# Patient Record
Sex: FEMALE | Race: WHITE | URBAN - METROPOLITAN AREA
[De-identification: names, ages, dates, MRNs, and addresses within clinical notes are randomized per-mention and may not be internally consistent; named-entity substitution may affect disease eponyms.]

---

## 2017-01-01 ENCOUNTER — CARE COORDINATION (OUTPATIENT)
Dept: GASTROENTEROLOGY | Facility: CLINIC | Age: 77
End: 2017-01-01

## 2017-01-01 ENCOUNTER — DOCUMENTATION ONLY (OUTPATIENT)
Dept: GASTROENTEROLOGY | Facility: CLINIC | Age: 77
End: 2017-01-01

## 2017-01-01 ENCOUNTER — TRANSFERRED RECORDS (OUTPATIENT)
Dept: HEALTH INFORMATION MANAGEMENT | Facility: CLINIC | Age: 77
End: 2017-01-01

## 2017-01-01 ENCOUNTER — ANESTHESIA (OUTPATIENT)
Dept: SURGERY | Facility: CLINIC | Age: 77
DRG: 435 | End: 2017-01-01
Payer: MEDICARE

## 2017-01-01 ENCOUNTER — MEDICAL CORRESPONDENCE (OUTPATIENT)
Dept: HEALTH INFORMATION MANAGEMENT | Facility: CLINIC | Age: 77
End: 2017-01-01

## 2017-01-01 ENCOUNTER — ANESTHESIA EVENT (OUTPATIENT)
Dept: SURGERY | Facility: CLINIC | Age: 77
DRG: 435 | End: 2017-01-01
Payer: MEDICARE

## 2017-01-01 ENCOUNTER — CARE COORDINATION (OUTPATIENT)
Dept: CARE COORDINATION | Facility: CLINIC | Age: 77
End: 2017-01-01

## 2017-01-01 ENCOUNTER — HOSPITAL ENCOUNTER (INPATIENT)
Facility: CLINIC | Age: 77
LOS: 4 days | Discharge: HOME | DRG: 435 | End: 2017-12-05
Attending: INTERNAL MEDICINE | Admitting: FAMILY MEDICINE
Payer: MEDICARE

## 2017-01-01 ENCOUNTER — HOSPITAL ENCOUNTER (EMERGENCY)
Facility: CLINIC | Age: 77
Discharge: HOME | End: 2017-10-30
Attending: EMERGENCY MEDICINE | Admitting: EMERGENCY MEDICINE
Payer: MEDICARE

## 2017-01-01 ENCOUNTER — APPOINTMENT (OUTPATIENT)
Dept: GENERAL RADIOLOGY | Facility: CLINIC | Age: 77
DRG: 435 | End: 2017-01-01
Attending: INTERNAL MEDICINE
Payer: MEDICARE

## 2017-01-01 ENCOUNTER — APPOINTMENT (OUTPATIENT)
Dept: GENERAL RADIOLOGY | Facility: CLINIC | Age: 77
End: 2017-01-01
Attending: EMERGENCY MEDICINE
Payer: MEDICARE

## 2017-01-01 VITALS
HEART RATE: 81 BPM | TEMPERATURE: 98.1 F | OXYGEN SATURATION: 94 % | WEIGHT: 167.9 LBS | DIASTOLIC BLOOD PRESSURE: 46 MMHG | HEIGHT: 60 IN | RESPIRATION RATE: 16 BRPM | BODY MASS INDEX: 32.96 KG/M2 | SYSTOLIC BLOOD PRESSURE: 148 MMHG

## 2017-01-01 VITALS
WEIGHT: 165 LBS | RESPIRATION RATE: 18 BRPM | TEMPERATURE: 97.9 F | SYSTOLIC BLOOD PRESSURE: 132 MMHG | DIASTOLIC BLOOD PRESSURE: 90 MMHG | BODY MASS INDEX: 29.24 KG/M2 | OXYGEN SATURATION: 96 % | HEART RATE: 89 BPM

## 2017-01-01 DIAGNOSIS — I67.83 PRES (POSTERIOR REVERSIBLE ENCEPHALOPATHY SYNDROME): ICD-10-CM

## 2017-01-01 DIAGNOSIS — K86.89 PANCREATIC MASS: Primary | ICD-10-CM

## 2017-01-01 DIAGNOSIS — R10.84 ABDOMINAL PAIN, GENERALIZED: ICD-10-CM

## 2017-01-01 LAB
ALBUMIN SERPL-MCNC: 1.5 G/DL (ref 3.4–5)
ALBUMIN SERPL-MCNC: 1.6 G/DL (ref 3.4–5)
ALBUMIN SERPL-MCNC: 1.7 G/DL (ref 3.4–5)
ALBUMIN SERPL-MCNC: 1.7 G/DL (ref 3.4–5)
ALBUMIN SERPL-MCNC: 1.8 G/DL (ref 3.4–5)
ALBUMIN SERPL-MCNC: 2.5 G/DL (ref 3.4–5)
ALBUMIN UR-MCNC: 10 MG/DL
ALBUMIN UR-MCNC: NEGATIVE MG/DL
ALP SERPL-CCNC: 1371 U/L (ref 40–150)
ALP SERPL-CCNC: 146 U/L (ref 40–150)
ALP SERPL-CCNC: 1632 U/L (ref 40–150)
ALP SERPL-CCNC: 1678 U/L (ref 40–150)
ALP SERPL-CCNC: >2330 U/L (ref 40–150)
ALP SERPL-CCNC: >2330 U/L (ref 40–150)
ALT SERPL W P-5'-P-CCNC: 147 U/L (ref 0–50)
ALT SERPL W P-5'-P-CCNC: 178 U/L (ref 0–50)
ALT SERPL W P-5'-P-CCNC: 20 U/L (ref 0–50)
ALT SERPL W P-5'-P-CCNC: 73 U/L (ref 0–50)
ALT SERPL W P-5'-P-CCNC: 86 U/L (ref 0–50)
ALT SERPL W P-5'-P-CCNC: 99 U/L (ref 0–50)
ANION GAP SERPL CALCULATED.3IONS-SCNC: 5 MMOL/L (ref 3–14)
ANION GAP SERPL CALCULATED.3IONS-SCNC: 7 MMOL/L (ref 3–14)
ANION GAP SERPL CALCULATED.3IONS-SCNC: 8 MMOL/L (ref 3–14)
ANION GAP SERPL CALCULATED.3IONS-SCNC: 9 MMOL/L (ref 3–14)
ANNOTATION COMMENT IMP: ABNORMAL
APPEARANCE UR: ABNORMAL
APPEARANCE UR: CLEAR
AST SERPL W P-5'-P-CCNC: 143 U/L (ref 0–45)
AST SERPL W P-5'-P-CCNC: 25 U/L (ref 0–45)
AST SERPL W P-5'-P-CCNC: 310 U/L (ref 0–45)
AST SERPL W P-5'-P-CCNC: 48 U/L (ref 0–45)
AST SERPL W P-5'-P-CCNC: 58 U/L (ref 0–45)
AST SERPL W P-5'-P-CCNC: 69 U/L (ref 0–45)
BACTERIA SPEC CULT: ABNORMAL
BACTERIA SPEC CULT: ABNORMAL
BASOPHILS # BLD AUTO: 0 10E9/L (ref 0–0.2)
BASOPHILS NFR BLD AUTO: 0.4 %
BASOPHILS NFR BLD AUTO: 0.5 %
BASOPHILS NFR BLD AUTO: 0.5 %
BASOPHILS NFR BLD AUTO: 0.9 %
BILIRUB DIRECT SERPL-MCNC: 7.4 MG/DL (ref 0–0.2)
BILIRUB SERPL-MCNC: 0.4 MG/DL (ref 0.2–1.3)
BILIRUB SERPL-MCNC: 2.4 MG/DL (ref 0.2–1.3)
BILIRUB SERPL-MCNC: 2.7 MG/DL (ref 0.2–1.3)
BILIRUB SERPL-MCNC: 3.1 MG/DL (ref 0.2–1.3)
BILIRUB SERPL-MCNC: 3.6 MG/DL (ref 0.2–1.3)
BILIRUB SERPL-MCNC: 8.8 MG/DL (ref 0.2–1.3)
BILIRUB UR QL STRIP: ABNORMAL
BILIRUB UR QL STRIP: NEGATIVE
BUN SERPL-MCNC: 14 MG/DL (ref 7–30)
BUN SERPL-MCNC: 4 MG/DL (ref 7–30)
BUN SERPL-MCNC: 5 MG/DL (ref 7–30)
BUN SERPL-MCNC: 5 MG/DL (ref 7–30)
BUN SERPL-MCNC: 6 MG/DL (ref 7–30)
BUN SERPL-MCNC: 6 MG/DL (ref 7–30)
CALCIUM SERPL-MCNC: 7.8 MG/DL (ref 8.5–10.1)
CALCIUM SERPL-MCNC: 7.9 MG/DL (ref 8.5–10.1)
CALCIUM SERPL-MCNC: 8.2 MG/DL (ref 8.5–10.1)
CALCIUM SERPL-MCNC: 8.6 MG/DL (ref 8.5–10.1)
CAOX CRY #/AREA URNS HPF: ABNORMAL /HPF
CHLORIDE SERPL-SCNC: 107 MMOL/L (ref 94–109)
CHLORIDE SERPL-SCNC: 107 MMOL/L (ref 94–109)
CHLORIDE SERPL-SCNC: 109 MMOL/L (ref 94–109)
CHLORIDE SERPL-SCNC: 109 MMOL/L (ref 94–109)
CHLORIDE SERPL-SCNC: 110 MMOL/L (ref 94–109)
CHLORIDE SERPL-SCNC: 111 MMOL/L (ref 94–109)
CO2 SERPL-SCNC: 22 MMOL/L (ref 20–32)
CO2 SERPL-SCNC: 23 MMOL/L (ref 20–32)
CO2 SERPL-SCNC: 24 MMOL/L (ref 20–32)
CO2 SERPL-SCNC: 24 MMOL/L (ref 20–32)
CO2 SERPL-SCNC: 25 MMOL/L (ref 20–32)
CO2 SERPL-SCNC: 27 MMOL/L (ref 20–32)
COLOR UR AUTO: ABNORMAL
COLOR UR AUTO: YELLOW
COPATH REPORT: NORMAL
COPPER SERPL-MCNC: 81 UG/DL (ref 80–155)
CREAT SERPL-MCNC: 0.64 MG/DL (ref 0.52–1.04)
CREAT SERPL-MCNC: 0.69 MG/DL (ref 0.52–1.04)
CREAT SERPL-MCNC: 0.71 MG/DL (ref 0.52–1.04)
CREAT SERPL-MCNC: 0.72 MG/DL (ref 0.52–1.04)
CREAT SERPL-MCNC: 0.74 MG/DL (ref 0.52–1.04)
CREAT SERPL-MCNC: 0.81 MG/DL (ref 0.52–1.04)
DEPRECATED CALCIDIOL+CALCIFEROL SERPL-MC: 40 UG/L (ref 20–75)
DIFFERENTIAL METHOD BLD: ABNORMAL
EOSINOPHIL # BLD AUTO: 0.1 10E9/L (ref 0–0.7)
EOSINOPHIL # BLD AUTO: 0.2 10E9/L (ref 0–0.7)
EOSINOPHIL # BLD AUTO: 0.2 10E9/L (ref 0–0.7)
EOSINOPHIL # BLD AUTO: 0.3 10E9/L (ref 0–0.7)
EOSINOPHIL # BLD AUTO: 0.3 10E9/L (ref 0–0.7)
EOSINOPHIL # BLD AUTO: 0.4 10E9/L (ref 0–0.7)
EOSINOPHIL NFR BLD AUTO: 1.6 %
EOSINOPHIL NFR BLD AUTO: 4.3 %
EOSINOPHIL NFR BLD AUTO: 5.9 %
EOSINOPHIL NFR BLD AUTO: 6.8 %
EOSINOPHIL NFR BLD AUTO: 6.9 %
EOSINOPHIL NFR BLD AUTO: 7.6 %
ERCP: NORMAL
ERYTHROCYTE [DISTWIDTH] IN BLOOD BY AUTOMATED COUNT: 14.7 % (ref 10–15)
ERYTHROCYTE [DISTWIDTH] IN BLOOD BY AUTOMATED COUNT: 17 % (ref 10–15)
ERYTHROCYTE [DISTWIDTH] IN BLOOD BY AUTOMATED COUNT: 17.1 % (ref 10–15)
ERYTHROCYTE [DISTWIDTH] IN BLOOD BY AUTOMATED COUNT: 17.2 % (ref 10–15)
ERYTHROCYTE [DISTWIDTH] IN BLOOD BY AUTOMATED COUNT: 17.6 % (ref 10–15)
ERYTHROCYTE [DISTWIDTH] IN BLOOD BY AUTOMATED COUNT: 18.2 % (ref 10–15)
FERRITIN SERPL-MCNC: 276 NG/ML (ref 8–252)
FOLATE SERPL-MCNC: 19.9 NG/ML
GFR SERPL CREATININE-BSD FRML MDRD: 69 ML/MIN/1.7M2
GFR SERPL CREATININE-BSD FRML MDRD: 75 ML/MIN/1.7M2
GFR SERPL CREATININE-BSD FRML MDRD: 79 ML/MIN/1.7M2
GFR SERPL CREATININE-BSD FRML MDRD: 80 ML/MIN/1.7M2
GFR SERPL CREATININE-BSD FRML MDRD: 83 ML/MIN/1.7M2
GFR SERPL CREATININE-BSD FRML MDRD: >90 ML/MIN/1.7M2
GLUCOSE BLDC GLUCOMTR-MCNC: 90 MG/DL (ref 70–99)
GLUCOSE SERPL-MCNC: 109 MG/DL (ref 70–99)
GLUCOSE SERPL-MCNC: 110 MG/DL (ref 70–99)
GLUCOSE SERPL-MCNC: 120 MG/DL (ref 70–99)
GLUCOSE SERPL-MCNC: 128 MG/DL (ref 70–99)
GLUCOSE SERPL-MCNC: 93 MG/DL (ref 70–99)
GLUCOSE SERPL-MCNC: 99 MG/DL (ref 70–99)
GLUCOSE UR STRIP-MCNC: NEGATIVE MG/DL
GLUCOSE UR STRIP-MCNC: NEGATIVE MG/DL
HCT VFR BLD AUTO: 32.9 % (ref 35–47)
HCT VFR BLD AUTO: 33.8 % (ref 35–47)
HCT VFR BLD AUTO: 34 % (ref 35–47)
HCT VFR BLD AUTO: 35.4 % (ref 35–47)
HCT VFR BLD AUTO: 37.2 % (ref 35–47)
HCT VFR BLD AUTO: 39.9 % (ref 35–47)
HGB BLD-MCNC: 10.3 G/DL (ref 11.7–15.7)
HGB BLD-MCNC: 10.4 G/DL (ref 11.7–15.7)
HGB BLD-MCNC: 10.7 G/DL (ref 11.7–15.7)
HGB BLD-MCNC: 11.3 G/DL (ref 11.7–15.7)
HGB BLD-MCNC: 12.3 G/DL (ref 11.7–15.7)
HGB BLD-MCNC: 9.9 G/DL (ref 11.7–15.7)
HGB UR QL STRIP: NEGATIVE
HGB UR QL STRIP: NEGATIVE
HYALINE CASTS #/AREA URNS LPF: 14 /LPF (ref 0–2)
IMM GRANULOCYTES # BLD: 0 10E9/L (ref 0–0.4)
IMM GRANULOCYTES NFR BLD: 0 %
IMM GRANULOCYTES NFR BLD: 0.2 %
IMM GRANULOCYTES NFR BLD: 0.5 %
IMM GRANULOCYTES NFR BLD: 0.6 %
INR PPP: 1.09 (ref 0.86–1.14)
IRON SATN MFR SERPL: 51 % (ref 15–46)
IRON SERPL-MCNC: 63 UG/DL (ref 35–180)
KETONES UR STRIP-MCNC: NEGATIVE MG/DL
KETONES UR STRIP-MCNC: NEGATIVE MG/DL
LACTATE BLD-SCNC: 0.8 MMOL/L (ref 0.7–2)
LACTATE BLD-SCNC: 0.8 MMOL/L (ref 0.7–2)
LEUKOCYTE ESTERASE UR QL STRIP: NEGATIVE
LEUKOCYTE ESTERASE UR QL STRIP: NEGATIVE
LIPASE SERPL-CCNC: 31 U/L (ref 73–393)
LIPASE SERPL-CCNC: 55 U/L (ref 73–393)
LYMPHOCYTES # BLD AUTO: 0.9 10E9/L (ref 0.8–5.3)
LYMPHOCYTES # BLD AUTO: 1 10E9/L (ref 0.8–5.3)
LYMPHOCYTES # BLD AUTO: 1 10E9/L (ref 0.8–5.3)
LYMPHOCYTES # BLD AUTO: 1.1 10E9/L (ref 0.8–5.3)
LYMPHOCYTES # BLD AUTO: 1.3 10E9/L (ref 0.8–5.3)
LYMPHOCYTES # BLD AUTO: 1.6 10E9/L (ref 0.8–5.3)
LYMPHOCYTES NFR BLD AUTO: 19.8 %
LYMPHOCYTES NFR BLD AUTO: 21 %
LYMPHOCYTES NFR BLD AUTO: 23 %
LYMPHOCYTES NFR BLD AUTO: 23.1 %
LYMPHOCYTES NFR BLD AUTO: 28.4 %
LYMPHOCYTES NFR BLD AUTO: 32 %
Lab: ABNORMAL
MCH RBC QN AUTO: 30.2 PG (ref 26.5–33)
MCH RBC QN AUTO: 30.7 PG (ref 26.5–33)
MCH RBC QN AUTO: 30.7 PG (ref 26.5–33)
MCH RBC QN AUTO: 30.8 PG (ref 26.5–33)
MCH RBC QN AUTO: 31 PG (ref 26.5–33)
MCH RBC QN AUTO: 31 PG (ref 26.5–33)
MCHC RBC AUTO-ENTMCNC: 30.1 G/DL (ref 31.5–36.5)
MCHC RBC AUTO-ENTMCNC: 30.2 G/DL (ref 31.5–36.5)
MCHC RBC AUTO-ENTMCNC: 30.4 G/DL (ref 31.5–36.5)
MCHC RBC AUTO-ENTMCNC: 30.5 G/DL (ref 31.5–36.5)
MCHC RBC AUTO-ENTMCNC: 30.6 G/DL (ref 31.5–36.5)
MCHC RBC AUTO-ENTMCNC: 30.8 G/DL (ref 31.5–36.5)
MCV RBC AUTO: 100 FL (ref 78–100)
MCV RBC AUTO: 100 FL (ref 78–100)
MCV RBC AUTO: 101 FL (ref 78–100)
MCV RBC AUTO: 101 FL (ref 78–100)
MCV RBC AUTO: 102 FL (ref 78–100)
MCV RBC AUTO: 102 FL (ref 78–100)
METHYLMALONATE SERPL-SCNC: 0.18 UMOL/L (ref 0–0.4)
MONOCYTES # BLD AUTO: 0.4 10E9/L (ref 0–1.3)
MONOCYTES # BLD AUTO: 0.5 10E9/L (ref 0–1.3)
MONOCYTES NFR BLD AUTO: 10.3 %
MONOCYTES NFR BLD AUTO: 7.9 %
MONOCYTES NFR BLD AUTO: 8 %
MONOCYTES NFR BLD AUTO: 8.9 %
MONOCYTES NFR BLD AUTO: 9.2 %
MONOCYTES NFR BLD AUTO: 9.6 %
MUCOUS THREADS #/AREA URNS LPF: PRESENT /LPF
MUCOUS THREADS #/AREA URNS LPF: PRESENT /LPF
NEUTROPHILS # BLD AUTO: 2.4 10E9/L (ref 1.6–8.3)
NEUTROPHILS # BLD AUTO: 2.4 10E9/L (ref 1.6–8.3)
NEUTROPHILS # BLD AUTO: 2.6 10E9/L (ref 1.6–8.3)
NEUTROPHILS # BLD AUTO: 3 10E9/L (ref 1.6–8.3)
NEUTROPHILS # BLD AUTO: 3.1 10E9/L (ref 1.6–8.3)
NEUTROPHILS # BLD AUTO: 3.3 10E9/L (ref 1.6–8.3)
NEUTROPHILS NFR BLD AUTO: 49.5 %
NEUTROPHILS NFR BLD AUTO: 55.6 %
NEUTROPHILS NFR BLD AUTO: 60.9 %
NEUTROPHILS NFR BLD AUTO: 62 %
NEUTROPHILS NFR BLD AUTO: 66 %
NEUTROPHILS NFR BLD AUTO: 66.9 %
NITRATE UR QL: NEGATIVE
NITRATE UR QL: NEGATIVE
NRBC # BLD AUTO: 0 10*3/UL
NRBC BLD AUTO-RTO: 0 /100
PH UR STRIP: 5 PH (ref 5–7)
PH UR STRIP: 6 PH (ref 5–7)
PLATELET # BLD AUTO: 183 10E9/L (ref 150–450)
PLATELET # BLD AUTO: 246 10E9/L (ref 150–450)
PLATELET # BLD AUTO: 259 10E9/L (ref 150–450)
PLATELET # BLD AUTO: 262 10E9/L (ref 150–450)
PLATELET # BLD AUTO: 271 10E9/L (ref 150–450)
PLATELET # BLD AUTO: 278 10E9/L (ref 150–450)
POTASSIUM SERPL-SCNC: 3.3 MMOL/L (ref 3.4–5.3)
POTASSIUM SERPL-SCNC: 3.7 MMOL/L (ref 3.4–5.3)
POTASSIUM SERPL-SCNC: 3.7 MMOL/L (ref 3.4–5.3)
POTASSIUM SERPL-SCNC: 4 MMOL/L (ref 3.4–5.3)
POTASSIUM SERPL-SCNC: 4.1 MMOL/L (ref 3.4–5.3)
POTASSIUM SERPL-SCNC: 4.2 MMOL/L (ref 3.4–5.3)
POTASSIUM SERPL-SCNC: 4.2 MMOL/L (ref 3.4–5.3)
PROT SERPL-MCNC: 4.5 G/DL (ref 6.8–8.8)
PROT SERPL-MCNC: 4.6 G/DL (ref 6.8–8.8)
PROT SERPL-MCNC: 4.9 G/DL (ref 6.8–8.8)
PROT SERPL-MCNC: 4.9 G/DL (ref 6.8–8.8)
PROT SERPL-MCNC: 5.1 G/DL (ref 6.8–8.8)
PROT SERPL-MCNC: 5.5 G/DL (ref 6.8–8.8)
RBC # BLD AUTO: 3.23 10E12/L (ref 3.8–5.2)
RBC # BLD AUTO: 3.34 10E12/L (ref 3.8–5.2)
RBC # BLD AUTO: 3.36 10E12/L (ref 3.8–5.2)
RBC # BLD AUTO: 3.54 10E12/L (ref 3.8–5.2)
RBC # BLD AUTO: 3.64 10E12/L (ref 3.8–5.2)
RBC # BLD AUTO: 4.01 10E12/L (ref 3.8–5.2)
RBC #/AREA URNS AUTO: 0 /HPF (ref 0–2)
RBC #/AREA URNS AUTO: 3 /HPF (ref 0–2)
RETINYL PALMITATE SERPL-MCNC: 0.04 MG/L (ref 0–0.1)
SELENIUM SERPL-MCNC: 96 UG/L (ref 23–190)
SODIUM SERPL-SCNC: 139 MMOL/L (ref 133–144)
SODIUM SERPL-SCNC: 141 MMOL/L (ref 133–144)
SODIUM SERPL-SCNC: 142 MMOL/L (ref 133–144)
SODIUM SERPL-SCNC: 143 MMOL/L (ref 133–144)
SOURCE: ABNORMAL
SOURCE: ABNORMAL
SP GR UR STRIP: 1.01 (ref 1–1.03)
SP GR UR STRIP: 1.01 (ref 1–1.03)
SPECIMEN SOURCE: ABNORMAL
SQUAMOUS #/AREA URNS AUTO: 1 /HPF (ref 0–1)
SQUAMOUS #/AREA URNS AUTO: <1 /HPF (ref 0–1)
TIBC SERPL-MCNC: 124 UG/DL (ref 240–430)
TRANSFERRIN SERPL-MCNC: 97 MG/DL (ref 210–360)
UPPER EUS: NORMAL
UROBILINOGEN UR STRIP-MCNC: NORMAL MG/DL (ref 0–2)
UROBILINOGEN UR STRIP-MCNC: NORMAL MG/DL (ref 0–2)
VIT A SERPL-MCNC: 0.27 MG/L (ref 0.3–1.2)
VIT B12 SERPL-MCNC: >6000 PG/ML (ref 193–986)
VIT B6 SERPL-MCNC: 6.5 NMOL/L (ref 20–125)
WBC # BLD AUTO: 3.9 10E9/L (ref 4–11)
WBC # BLD AUTO: 4.4 10E9/L (ref 4–11)
WBC # BLD AUTO: 4.6 10E9/L (ref 4–11)
WBC # BLD AUTO: 4.8 10E9/L (ref 4–11)
WBC # BLD AUTO: 5 10E9/L (ref 4–11)
WBC # BLD AUTO: 5.1 10E9/L (ref 4–11)
WBC #/AREA URNS AUTO: 1 /HPF (ref 0–2)
WBC #/AREA URNS AUTO: <1 /HPF (ref 0–2)
ZINC SERPL-MCNC: 60 UG/DL (ref 60–120)

## 2017-01-01 PROCEDURE — 83605 ASSAY OF LACTIC ACID: CPT | Performed by: STUDENT IN AN ORGANIZED HEALTH CARE EDUCATION/TRAINING PROGRAM

## 2017-01-01 PROCEDURE — C1726 CATH, BAL DIL, NON-VASCULAR: HCPCS | Performed by: INTERNAL MEDICINE

## 2017-01-01 PROCEDURE — 12000008 ZZH R&B INTERMEDIATE UMMC

## 2017-01-01 PROCEDURE — 25000128 H RX IP 250 OP 636: Performed by: FAMILY MEDICINE

## 2017-01-01 PROCEDURE — 80053 COMPREHEN METABOLIC PANEL: CPT | Performed by: STUDENT IN AN ORGANIZED HEALTH CARE EDUCATION/TRAINING PROGRAM

## 2017-01-01 PROCEDURE — A9270 NON-COVERED ITEM OR SERVICE: HCPCS | Mod: GY | Performed by: STUDENT IN AN ORGANIZED HEALTH CARE EDUCATION/TRAINING PROGRAM

## 2017-01-01 PROCEDURE — A9270 NON-COVERED ITEM OR SERVICE: HCPCS | Mod: GY | Performed by: FAMILY MEDICINE

## 2017-01-01 PROCEDURE — 36415 COLL VENOUS BLD VENIPUNCTURE: CPT | Performed by: STUDENT IN AN ORGANIZED HEALTH CARE EDUCATION/TRAINING PROGRAM

## 2017-01-01 PROCEDURE — 25000132 ZZH RX MED GY IP 250 OP 250 PS 637: Mod: GY | Performed by: STUDENT IN AN ORGANIZED HEALTH CARE EDUCATION/TRAINING PROGRAM

## 2017-01-01 PROCEDURE — 25000128 H RX IP 250 OP 636: Performed by: STUDENT IN AN ORGANIZED HEALTH CARE EDUCATION/TRAINING PROGRAM

## 2017-01-01 PROCEDURE — 84630 ASSAY OF ZINC: CPT | Performed by: STUDENT IN AN ORGANIZED HEALTH CARE EDUCATION/TRAINING PROGRAM

## 2017-01-01 PROCEDURE — C1876 STENT, NON-COA/NON-COV W/DEL: HCPCS | Performed by: INTERNAL MEDICINE

## 2017-01-01 PROCEDURE — 25000125 ZZHC RX 250: Performed by: FAMILY MEDICINE

## 2017-01-01 PROCEDURE — 88305 TISSUE EXAM BY PATHOLOGIST: CPT | Performed by: FAMILY MEDICINE

## 2017-01-01 PROCEDURE — 99283 EMERGENCY DEPT VISIT LOW MDM: CPT | Mod: Z6 | Performed by: EMERGENCY MEDICINE

## 2017-01-01 PROCEDURE — 27210794 ZZH OR GENERAL SUPPLY STERILE: Performed by: INTERNAL MEDICINE

## 2017-01-01 PROCEDURE — 82306 VITAMIN D 25 HYDROXY: CPT | Performed by: STUDENT IN AN ORGANIZED HEALTH CARE EDUCATION/TRAINING PROGRAM

## 2017-01-01 PROCEDURE — 88172 CYTP DX EVAL FNA 1ST EA SITE: CPT | Performed by: FAMILY MEDICINE

## 2017-01-01 PROCEDURE — 0FBG8ZX EXCISION OF PANCREAS, VIA NATURAL OR ARTIFICIAL OPENING ENDOSCOPIC, DIAGNOSTIC: ICD-10-PCS | Performed by: INTERNAL MEDICINE

## 2017-01-01 PROCEDURE — 87086 URINE CULTURE/COLONY COUNT: CPT | Performed by: STUDENT IN AN ORGANIZED HEALTH CARE EDUCATION/TRAINING PROGRAM

## 2017-01-01 PROCEDURE — 12000001 ZZH R&B MED SURG/OB UMMC

## 2017-01-01 PROCEDURE — 83921 ORGANIC ACID SINGLE QUANT: CPT | Performed by: STUDENT IN AN ORGANIZED HEALTH CARE EDUCATION/TRAINING PROGRAM

## 2017-01-01 PROCEDURE — 85610 PROTHROMBIN TIME: CPT | Performed by: STUDENT IN AN ORGANIZED HEALTH CARE EDUCATION/TRAINING PROGRAM

## 2017-01-01 PROCEDURE — 00000146 ZZHCL STATISTIC GLUCOSE BY METER IP

## 2017-01-01 PROCEDURE — 36000070 ZZH SURGERY LEVEL 5 EA 15 ADDTL MIN - UMMC: Performed by: INTERNAL MEDICINE

## 2017-01-01 PROCEDURE — 84207 ASSAY OF VITAMIN B-6: CPT | Performed by: STUDENT IN AN ORGANIZED HEALTH CARE EDUCATION/TRAINING PROGRAM

## 2017-01-01 PROCEDURE — C1769 GUIDE WIRE: HCPCS | Performed by: INTERNAL MEDICINE

## 2017-01-01 PROCEDURE — 83605 ASSAY OF LACTIC ACID: CPT | Performed by: EMERGENCY MEDICINE

## 2017-01-01 PROCEDURE — A9270 NON-COVERED ITEM OR SERVICE: HCPCS | Mod: GY | Performed by: EMERGENCY MEDICINE

## 2017-01-01 PROCEDURE — 25000132 ZZH RX MED GY IP 250 OP 250 PS 637: Mod: GY | Performed by: EMERGENCY MEDICINE

## 2017-01-01 PROCEDURE — 25000125 ZZHC RX 250: Performed by: STUDENT IN AN ORGANIZED HEALTH CARE EDUCATION/TRAINING PROGRAM

## 2017-01-01 PROCEDURE — 82607 VITAMIN B-12: CPT | Performed by: STUDENT IN AN ORGANIZED HEALTH CARE EDUCATION/TRAINING PROGRAM

## 2017-01-01 PROCEDURE — 83690 ASSAY OF LIPASE: CPT | Performed by: STUDENT IN AN ORGANIZED HEALTH CARE EDUCATION/TRAINING PROGRAM

## 2017-01-01 PROCEDURE — 84466 ASSAY OF TRANSFERRIN: CPT | Performed by: STUDENT IN AN ORGANIZED HEALTH CARE EDUCATION/TRAINING PROGRAM

## 2017-01-01 PROCEDURE — 87088 URINE BACTERIA CULTURE: CPT | Performed by: STUDENT IN AN ORGANIZED HEALTH CARE EDUCATION/TRAINING PROGRAM

## 2017-01-01 PROCEDURE — 25000128 H RX IP 250 OP 636: Performed by: INTERNAL MEDICINE

## 2017-01-01 PROCEDURE — 85025 COMPLETE CBC W/AUTO DIFF WBC: CPT | Performed by: STUDENT IN AN ORGANIZED HEALTH CARE EDUCATION/TRAINING PROGRAM

## 2017-01-01 PROCEDURE — 71000014 ZZH RECOVERY PHASE 1 LEVEL 2 FIRST HR: Performed by: INTERNAL MEDICINE

## 2017-01-01 PROCEDURE — 25500064 ZZH RX 255 OP 636: Performed by: INTERNAL MEDICINE

## 2017-01-01 PROCEDURE — 25000132 ZZH RX MED GY IP 250 OP 250 PS 637: Mod: GY | Performed by: FAMILY MEDICINE

## 2017-01-01 PROCEDURE — 82746 ASSAY OF FOLIC ACID SERUM: CPT | Performed by: STUDENT IN AN ORGANIZED HEALTH CARE EDUCATION/TRAINING PROGRAM

## 2017-01-01 PROCEDURE — 25000128 H RX IP 250 OP 636: Performed by: NURSE ANESTHETIST, CERTIFIED REGISTERED

## 2017-01-01 PROCEDURE — 83540 ASSAY OF IRON: CPT | Performed by: STUDENT IN AN ORGANIZED HEALTH CARE EDUCATION/TRAINING PROGRAM

## 2017-01-01 PROCEDURE — 84590 ASSAY OF VITAMIN A: CPT | Performed by: STUDENT IN AN ORGANIZED HEALTH CARE EDUCATION/TRAINING PROGRAM

## 2017-01-01 PROCEDURE — 84255 ASSAY OF SELENIUM: CPT | Performed by: STUDENT IN AN ORGANIZED HEALTH CARE EDUCATION/TRAINING PROGRAM

## 2017-01-01 PROCEDURE — A9270 NON-COVERED ITEM OR SERVICE: HCPCS | Performed by: INTERNAL MEDICINE

## 2017-01-01 PROCEDURE — 82248 BILIRUBIN DIRECT: CPT | Performed by: STUDENT IN AN ORGANIZED HEALTH CARE EDUCATION/TRAINING PROGRAM

## 2017-01-01 PROCEDURE — 83550 IRON BINDING TEST: CPT | Performed by: STUDENT IN AN ORGANIZED HEALTH CARE EDUCATION/TRAINING PROGRAM

## 2017-01-01 PROCEDURE — 0F798DZ DILATION OF COMMON BILE DUCT WITH INTRALUMINAL DEVICE, VIA NATURAL OR ARTIFICIAL OPENING ENDOSCOPIC: ICD-10-PCS | Performed by: INTERNAL MEDICINE

## 2017-01-01 PROCEDURE — 87186 SC STD MICRODIL/AGAR DIL: CPT | Performed by: STUDENT IN AN ORGANIZED HEALTH CARE EDUCATION/TRAINING PROGRAM

## 2017-01-01 PROCEDURE — 36000061 ZZH SURGERY LEVEL 3 W FLUORO 1ST 30 MIN - UMMC: Performed by: INTERNAL MEDICINE

## 2017-01-01 PROCEDURE — C9399 UNCLASSIFIED DRUGS OR BIOLOG: HCPCS | Performed by: STUDENT IN AN ORGANIZED HEALTH CARE EDUCATION/TRAINING PROGRAM

## 2017-01-01 PROCEDURE — 82525 ASSAY OF COPPER: CPT | Performed by: STUDENT IN AN ORGANIZED HEALTH CARE EDUCATION/TRAINING PROGRAM

## 2017-01-01 PROCEDURE — 80053 COMPREHEN METABOLIC PANEL: CPT | Performed by: EMERGENCY MEDICINE

## 2017-01-01 PROCEDURE — 40000170 ZZH STATISTIC PRE-PROCEDURE ASSESSMENT II: Performed by: INTERNAL MEDICINE

## 2017-01-01 PROCEDURE — 40000277 XR SURGERY CARM FLUORO LESS THAN 5 MIN W STILLS: Mod: TC

## 2017-01-01 PROCEDURE — 37000009 ZZH ANESTHESIA TECHNICAL FEE, EACH ADDTL 15 MIN: Performed by: INTERNAL MEDICINE

## 2017-01-01 PROCEDURE — 85025 COMPLETE CBC W/AUTO DIFF WBC: CPT | Performed by: EMERGENCY MEDICINE

## 2017-01-01 PROCEDURE — 00000155 ZZHCL STATISTIC H-CELL BLOCK W/STAIN: Performed by: FAMILY MEDICINE

## 2017-01-01 PROCEDURE — 74020 XR ABDOMEN 2 VW: CPT

## 2017-01-01 PROCEDURE — 84132 ASSAY OF SERUM POTASSIUM: CPT | Performed by: STUDENT IN AN ORGANIZED HEALTH CARE EDUCATION/TRAINING PROGRAM

## 2017-01-01 PROCEDURE — 25000566 ZZH SEVOFLURANE, EA 15 MIN: Performed by: INTERNAL MEDICINE

## 2017-01-01 PROCEDURE — 83690 ASSAY OF LIPASE: CPT | Performed by: EMERGENCY MEDICINE

## 2017-01-01 PROCEDURE — 82728 ASSAY OF FERRITIN: CPT | Performed by: STUDENT IN AN ORGANIZED HEALTH CARE EDUCATION/TRAINING PROGRAM

## 2017-01-01 PROCEDURE — 37000008 ZZH ANESTHESIA TECHNICAL FEE, 1ST 30 MIN: Performed by: INTERNAL MEDICINE

## 2017-01-01 PROCEDURE — 99284 EMERGENCY DEPT VISIT MOD MDM: CPT | Performed by: EMERGENCY MEDICINE

## 2017-01-01 PROCEDURE — 36000059 ZZH SURGERY LEVEL 3 EA 15 ADDTL MIN UMMC: Performed by: INTERNAL MEDICINE

## 2017-01-01 PROCEDURE — 88173 CYTOPATH EVAL FNA REPORT: CPT | Performed by: FAMILY MEDICINE

## 2017-01-01 PROCEDURE — 36000072 ZZH SURGERY LEVEL 5 W FLUORO 1ST 30 MIN - UMMC: Performed by: INTERNAL MEDICINE

## 2017-01-01 PROCEDURE — 81001 URINALYSIS AUTO W/SCOPE: CPT | Performed by: EMERGENCY MEDICINE

## 2017-01-01 PROCEDURE — 81001 URINALYSIS AUTO W/SCOPE: CPT | Performed by: STUDENT IN AN ORGANIZED HEALTH CARE EDUCATION/TRAINING PROGRAM

## 2017-01-01 PROCEDURE — 71000015 ZZH RECOVERY PHASE 1 LEVEL 2 EA ADDTL HR: Performed by: INTERNAL MEDICINE

## 2017-01-01 PROCEDURE — 40000893 ZZH STATISTIC PT IP EVAL DEFER: Performed by: REHABILITATION PRACTITIONER

## 2017-01-01 PROCEDURE — 40000141 ZZH STATISTIC PERIPHERAL IV START W/O US GUIDANCE

## 2017-01-01 PROCEDURE — 25000125 ZZHC RX 250: Performed by: INTERNAL MEDICINE

## 2017-01-01 DEVICE — STENT BILIARY WALLFLEX UNCOVERED 10X40MM M00570890: Type: IMPLANTABLE DEVICE | Site: PANCREATIC DUCT | Status: FUNCTIONAL

## 2017-01-01 DEVICE — STENT ESU AXIOS W/DEL SYS 15MMX10MM 10.8FR 138CM M00553650: Type: IMPLANTABLE DEVICE | Status: FUNCTIONAL

## 2017-01-01 RX ORDER — AMOXICILLIN 250 MG
1 CAPSULE ORAL 2 TIMES DAILY
Status: DISCONTINUED | OUTPATIENT
Start: 2017-01-01 | End: 2017-01-01 | Stop reason: HOSPADM

## 2017-01-01 RX ORDER — LABETALOL HYDROCHLORIDE 5 MG/ML
10 INJECTION, SOLUTION INTRAVENOUS
Status: DISCONTINUED | OUTPATIENT
Start: 2017-01-01 | End: 2017-01-01 | Stop reason: HOSPADM

## 2017-01-01 RX ORDER — LISINOPRIL 10 MG/1
10 TABLET ORAL EVERY EVENING
Status: DISCONTINUED | OUTPATIENT
Start: 2017-01-01 | End: 2017-01-01 | Stop reason: HOSPADM

## 2017-01-01 RX ORDER — TEMAZEPAM 15 MG/1
15 CAPSULE ORAL
Status: DISCONTINUED | OUTPATIENT
Start: 2017-01-01 | End: 2017-01-01 | Stop reason: HOSPADM

## 2017-01-01 RX ORDER — LEVETIRACETAM 1000 MG/1
1000 TABLET ORAL 2 TIMES DAILY
Qty: 60 TABLET | Refills: 0 | Status: SHIPPED | OUTPATIENT
Start: 2017-01-01

## 2017-01-01 RX ORDER — HYDROMORPHONE HYDROCHLORIDE 1 MG/ML
.3-.5 INJECTION, SOLUTION INTRAMUSCULAR; INTRAVENOUS; SUBCUTANEOUS
Status: DISCONTINUED | OUTPATIENT
Start: 2017-01-01 | End: 2017-01-01

## 2017-01-01 RX ORDER — IOPAMIDOL 510 MG/ML
INJECTION, SOLUTION INTRAVASCULAR PRN
Status: DISCONTINUED | OUTPATIENT
Start: 2017-01-01 | End: 2017-01-01 | Stop reason: HOSPADM

## 2017-01-01 RX ORDER — ACETAMINOPHEN 325 MG/1
650 TABLET ORAL EVERY 4 HOURS PRN
Status: DISCONTINUED | OUTPATIENT
Start: 2017-01-01 | End: 2017-01-01 | Stop reason: HOSPADM

## 2017-01-01 RX ORDER — OXYCODONE HYDROCHLORIDE 5 MG/1
5 TABLET ORAL EVERY 4 HOURS PRN
Status: DISCONTINUED | OUTPATIENT
Start: 2017-01-01 | End: 2017-01-01 | Stop reason: HOSPADM

## 2017-01-01 RX ORDER — FLUMAZENIL 0.1 MG/ML
0.2 INJECTION, SOLUTION INTRAVENOUS
Status: ACTIVE | OUTPATIENT
Start: 2017-01-01 | End: 2017-01-01

## 2017-01-01 RX ORDER — ONDANSETRON 2 MG/ML
4 INJECTION INTRAMUSCULAR; INTRAVENOUS EVERY 30 MIN PRN
Status: DISCONTINUED | OUTPATIENT
Start: 2017-01-01 | End: 2017-01-01 | Stop reason: HOSPADM

## 2017-01-01 RX ORDER — AMOXICILLIN 250 MG
2 CAPSULE ORAL 2 TIMES DAILY PRN
Status: DISCONTINUED | OUTPATIENT
Start: 2017-01-01 | End: 2017-01-01 | Stop reason: HOSPADM

## 2017-01-01 RX ORDER — SODIUM CHLORIDE 9 MG/ML
INJECTION, SOLUTION INTRAVENOUS CONTINUOUS
Status: DISCONTINUED | OUTPATIENT
Start: 2017-01-01 | End: 2017-01-01

## 2017-01-01 RX ORDER — LEVOFLOXACIN 5 MG/ML
INJECTION, SOLUTION INTRAVENOUS PRN
Status: DISCONTINUED | OUTPATIENT
Start: 2017-01-01 | End: 2017-01-01

## 2017-01-01 RX ORDER — ONDANSETRON 4 MG/1
4 TABLET, ORALLY DISINTEGRATING ORAL EVERY 30 MIN PRN
Status: DISCONTINUED | OUTPATIENT
Start: 2017-01-01 | End: 2017-01-01 | Stop reason: HOSPADM

## 2017-01-01 RX ORDER — LIDOCAINE 40 MG/G
CREAM TOPICAL
Status: DISCONTINUED | OUTPATIENT
Start: 2017-01-01 | End: 2017-01-01 | Stop reason: HOSPADM

## 2017-01-01 RX ORDER — ONDANSETRON 2 MG/ML
INJECTION INTRAMUSCULAR; INTRAVENOUS PRN
Status: DISCONTINUED | OUTPATIENT
Start: 2017-01-01 | End: 2017-01-01

## 2017-01-01 RX ORDER — INDOMETHACIN 50 MG/1
100 SUPPOSITORY RECTAL
Status: DISCONTINUED | OUTPATIENT
Start: 2017-01-01 | End: 2017-01-01 | Stop reason: HOSPADM

## 2017-01-01 RX ORDER — POTASSIUM CHLORIDE 750 MG/1
20-40 TABLET, EXTENDED RELEASE ORAL
Status: DISCONTINUED | OUTPATIENT
Start: 2017-01-01 | End: 2017-01-01 | Stop reason: HOSPADM

## 2017-01-01 RX ORDER — SULFAMETHOXAZOLE/TRIMETHOPRIM 800-160 MG
1 TABLET ORAL 2 TIMES DAILY
Status: COMPLETED | OUTPATIENT
Start: 2017-01-01 | End: 2017-01-01

## 2017-01-01 RX ORDER — MAGNESIUM SULFATE HEPTAHYDRATE 40 MG/ML
4 INJECTION, SOLUTION INTRAVENOUS EVERY 4 HOURS PRN
Status: DISCONTINUED | OUTPATIENT
Start: 2017-01-01 | End: 2017-01-01 | Stop reason: HOSPADM

## 2017-01-01 RX ORDER — EPHEDRINE SULFATE 50 MG/ML
INJECTION, SOLUTION INTRAMUSCULAR; INTRAVENOUS; SUBCUTANEOUS PRN
Status: DISCONTINUED | OUTPATIENT
Start: 2017-01-01 | End: 2017-01-01

## 2017-01-01 RX ORDER — POTASSIUM CHLORIDE 29.8 MG/ML
20 INJECTION INTRAVENOUS
Status: DISCONTINUED | OUTPATIENT
Start: 2017-01-01 | End: 2017-01-01 | Stop reason: CLARIF

## 2017-01-01 RX ORDER — NALOXONE HYDROCHLORIDE 0.4 MG/ML
.1-.4 INJECTION, SOLUTION INTRAMUSCULAR; INTRAVENOUS; SUBCUTANEOUS
Status: ACTIVE | OUTPATIENT
Start: 2017-01-01 | End: 2017-01-01

## 2017-01-01 RX ORDER — ESMOLOL HYDROCHLORIDE 10 MG/ML
INJECTION INTRAVENOUS PRN
Status: DISCONTINUED | OUTPATIENT
Start: 2017-01-01 | End: 2017-01-01

## 2017-01-01 RX ORDER — VIT C/E/ZN/COPPR/LUTEIN/ZEAXAN 60 MG-6 MG
1 CAPSULE ORAL DAILY
Status: DISCONTINUED | OUTPATIENT
Start: 2017-01-01 | End: 2017-01-01

## 2017-01-01 RX ORDER — LEVETIRACETAM 500 MG/1
1000 TABLET ORAL 2 TIMES DAILY
Status: DISCONTINUED | OUTPATIENT
Start: 2017-01-01 | End: 2017-01-01 | Stop reason: HOSPADM

## 2017-01-01 RX ORDER — MONTELUKAST SODIUM 10 MG/1
10 TABLET ORAL AT BEDTIME
Status: DISCONTINUED | OUTPATIENT
Start: 2017-01-01 | End: 2017-01-01 | Stop reason: HOSPADM

## 2017-01-01 RX ORDER — NALOXONE HYDROCHLORIDE 0.4 MG/ML
.1-.4 INJECTION, SOLUTION INTRAMUSCULAR; INTRAVENOUS; SUBCUTANEOUS
Status: DISCONTINUED | OUTPATIENT
Start: 2017-01-01 | End: 2017-01-01 | Stop reason: HOSPADM

## 2017-01-01 RX ORDER — FEXOFENADINE HCL 180 MG/1
180 TABLET ORAL DAILY
Status: DISCONTINUED | OUTPATIENT
Start: 2017-01-01 | End: 2017-01-01 | Stop reason: HOSPADM

## 2017-01-01 RX ORDER — AMOXICILLIN 250 MG
1 CAPSULE ORAL DAILY
Status: DISCONTINUED | OUTPATIENT
Start: 2017-01-01 | End: 2017-01-01

## 2017-01-01 RX ORDER — POTASSIUM CL/LIDO/0.9 % NACL 10MEQ/0.1L
10 INTRAVENOUS SOLUTION, PIGGYBACK (ML) INTRAVENOUS
Status: DISCONTINUED | OUTPATIENT
Start: 2017-01-01 | End: 2017-01-01 | Stop reason: HOSPADM

## 2017-01-01 RX ORDER — SODIUM CHLORIDE, SODIUM LACTATE, POTASSIUM CHLORIDE, CALCIUM CHLORIDE 600; 310; 30; 20 MG/100ML; MG/100ML; MG/100ML; MG/100ML
INJECTION, SOLUTION INTRAVENOUS CONTINUOUS PRN
Status: DISCONTINUED | OUTPATIENT
Start: 2017-01-01 | End: 2017-01-01

## 2017-01-01 RX ORDER — NALOXONE HYDROCHLORIDE 0.4 MG/ML
.1-.4 INJECTION, SOLUTION INTRAMUSCULAR; INTRAVENOUS; SUBCUTANEOUS
Status: DISCONTINUED | OUTPATIENT
Start: 2017-01-01 | End: 2017-01-01

## 2017-01-01 RX ORDER — POTASSIUM CHLORIDE 7.45 MG/ML
10 INJECTION INTRAVENOUS
Status: DISCONTINUED | OUTPATIENT
Start: 2017-01-01 | End: 2017-01-01 | Stop reason: HOSPADM

## 2017-01-01 RX ORDER — ONDANSETRON 2 MG/ML
4 INJECTION INTRAMUSCULAR; INTRAVENOUS EVERY 6 HOURS PRN
Status: DISCONTINUED | OUTPATIENT
Start: 2017-01-01 | End: 2017-01-01 | Stop reason: HOSPADM

## 2017-01-01 RX ORDER — VIT C/E/ZN/COPPR/LUTEIN/ZEAXAN 60 MG-6 MG
1 CAPSULE ORAL DAILY
Status: DISCONTINUED | OUTPATIENT
Start: 2017-01-01 | End: 2017-01-01 | Stop reason: HOSPADM

## 2017-01-01 RX ORDER — POTASSIUM CHLORIDE 1.5 G/1.58G
20-40 POWDER, FOR SOLUTION ORAL
Status: DISCONTINUED | OUTPATIENT
Start: 2017-01-01 | End: 2017-01-01 | Stop reason: HOSPADM

## 2017-01-01 RX ORDER — FENTANYL CITRATE 50 UG/ML
25-50 INJECTION, SOLUTION INTRAMUSCULAR; INTRAVENOUS
Status: DISCONTINUED | OUTPATIENT
Start: 2017-01-01 | End: 2017-01-01 | Stop reason: HOSPADM

## 2017-01-01 RX ORDER — FENTANYL CITRATE 50 UG/ML
25-50 INJECTION, SOLUTION INTRAMUSCULAR; INTRAVENOUS EVERY 5 MIN PRN
Status: DISCONTINUED | OUTPATIENT
Start: 2017-01-01 | End: 2017-01-01 | Stop reason: HOSPADM

## 2017-01-01 RX ORDER — ONDANSETRON 4 MG/1
4 TABLET, ORALLY DISINTEGRATING ORAL EVERY 6 HOURS PRN
Status: DISCONTINUED | OUTPATIENT
Start: 2017-01-01 | End: 2017-01-01 | Stop reason: HOSPADM

## 2017-01-01 RX ORDER — FENTANYL CITRATE 50 UG/ML
INJECTION, SOLUTION INTRAMUSCULAR; INTRAVENOUS PRN
Status: DISCONTINUED | OUTPATIENT
Start: 2017-01-01 | End: 2017-01-01

## 2017-01-01 RX ORDER — TEMAZEPAM 15 MG/1
15 CAPSULE ORAL
Status: DISCONTINUED | OUTPATIENT
Start: 2017-01-01 | End: 2017-01-01

## 2017-01-01 RX ORDER — ONDANSETRON 4 MG/1
4 TABLET, ORALLY DISINTEGRATING ORAL EVERY 6 HOURS PRN
Qty: 90 TABLET | Refills: 0 | Status: SHIPPED | OUTPATIENT
Start: 2017-01-01

## 2017-01-01 RX ORDER — PROPOFOL 10 MG/ML
INJECTION, EMULSION INTRAVENOUS PRN
Status: DISCONTINUED | OUTPATIENT
Start: 2017-01-01 | End: 2017-01-01

## 2017-01-01 RX ORDER — SODIUM CHLORIDE, SODIUM LACTATE, POTASSIUM CHLORIDE, CALCIUM CHLORIDE 600; 310; 30; 20 MG/100ML; MG/100ML; MG/100ML; MG/100ML
INJECTION, SOLUTION INTRAVENOUS CONTINUOUS
Status: DISCONTINUED | OUTPATIENT
Start: 2017-01-01 | End: 2017-01-01 | Stop reason: HOSPADM

## 2017-01-01 RX ORDER — LISINOPRIL 10 MG/1
20 TABLET ORAL DAILY
Status: DISCONTINUED | OUTPATIENT
Start: 2017-01-01 | End: 2017-01-01 | Stop reason: HOSPADM

## 2017-01-01 RX ORDER — HYDROMORPHONE HYDROCHLORIDE 1 MG/ML
0.5 INJECTION, SOLUTION INTRAMUSCULAR; INTRAVENOUS; SUBCUTANEOUS
Status: DISCONTINUED | OUTPATIENT
Start: 2017-01-01 | End: 2017-01-01

## 2017-01-01 RX ORDER — SERTRALINE HYDROCHLORIDE 100 MG/1
100 TABLET, FILM COATED ORAL DAILY
Status: DISCONTINUED | OUTPATIENT
Start: 2017-01-01 | End: 2017-01-01 | Stop reason: HOSPADM

## 2017-01-01 RX ORDER — LISINOPRIL 10 MG/1
10 TABLET ORAL 2 TIMES DAILY
Status: DISCONTINUED | OUTPATIENT
Start: 2017-01-01 | End: 2017-01-01

## 2017-01-01 RX ORDER — PROCHLORPERAZINE 25 MG
12.5 SUPPOSITORY, RECTAL RECTAL EVERY 12 HOURS PRN
Status: DISCONTINUED | OUTPATIENT
Start: 2017-01-01 | End: 2017-01-01 | Stop reason: HOSPADM

## 2017-01-01 RX ORDER — LOVASTATIN 20 MG
40 TABLET ORAL AT BEDTIME
Status: DISCONTINUED | OUTPATIENT
Start: 2017-01-01 | End: 2017-01-01 | Stop reason: HOSPADM

## 2017-01-01 RX ORDER — OXYCODONE HYDROCHLORIDE 5 MG/1
10 TABLET ORAL ONCE
Status: COMPLETED | OUTPATIENT
Start: 2017-01-01 | End: 2017-01-01

## 2017-01-01 RX ORDER — OXYCODONE HYDROCHLORIDE 5 MG/1
5 TABLET ORAL ONCE
Status: COMPLETED | OUTPATIENT
Start: 2017-01-01 | End: 2017-01-01

## 2017-01-01 RX ORDER — PROCHLORPERAZINE MALEATE 5 MG
5 TABLET ORAL EVERY 6 HOURS PRN
Status: DISCONTINUED | OUTPATIENT
Start: 2017-01-01 | End: 2017-01-01 | Stop reason: HOSPADM

## 2017-01-01 RX ORDER — AMOXICILLIN 250 MG
1 CAPSULE ORAL 2 TIMES DAILY PRN
Status: DISCONTINUED | OUTPATIENT
Start: 2017-01-01 | End: 2017-01-01 | Stop reason: HOSPADM

## 2017-01-01 RX ADMIN — SULFAMETHOXAZOLE AND TRIMETHOPRIM 1 TABLET: 800; 160 TABLET ORAL at 09:02

## 2017-01-01 RX ADMIN — SERTRALINE HYDROCHLORIDE 100 MG: 100 TABLET ORAL at 20:23

## 2017-01-01 RX ADMIN — OXYCODONE HYDROCHLORIDE 5 MG: 5 TABLET ORAL at 05:24

## 2017-01-01 RX ADMIN — LOVASTATIN 40 MG: 20 TABLET ORAL at 19:52

## 2017-01-01 RX ADMIN — ONDANSETRON 4 MG: 2 INJECTION INTRAMUSCULAR; INTRAVENOUS at 17:42

## 2017-01-01 RX ADMIN — ONDANSETRON 4 MG: 2 INJECTION INTRAMUSCULAR; INTRAVENOUS at 07:51

## 2017-01-01 RX ADMIN — PROCHLORPERAZINE EDISYLATE 5 MG: 5 INJECTION INTRAMUSCULAR; INTRAVENOUS at 01:17

## 2017-01-01 RX ADMIN — Medication 1 TABLET: at 09:57

## 2017-01-01 RX ADMIN — LOVASTATIN 40 MG: 20 TABLET ORAL at 20:52

## 2017-01-01 RX ADMIN — Medication 0.5 MG: at 21:59

## 2017-01-01 RX ADMIN — PHENYLEPHRINE HYDROCHLORIDE 50 MCG: 10 INJECTION, SOLUTION INTRAMUSCULAR; INTRAVENOUS; SUBCUTANEOUS at 17:41

## 2017-01-01 RX ADMIN — OMEPRAZOLE 20 MG: 20 CAPSULE, DELAYED RELEASE ORAL at 09:02

## 2017-01-01 RX ADMIN — SULFAMETHOXAZOLE AND TRIMETHOPRIM 1 TABLET: 800; 160 TABLET ORAL at 20:23

## 2017-01-01 RX ADMIN — ACETAMINOPHEN 650 MG: 325 TABLET, FILM COATED ORAL at 05:23

## 2017-01-01 RX ADMIN — LISINOPRIL 10 MG: 10 TABLET ORAL at 19:52

## 2017-01-01 RX ADMIN — Medication 0.5 MG: at 19:52

## 2017-01-01 RX ADMIN — OMEPRAZOLE 20 MG: 20 CAPSULE, DELAYED RELEASE ORAL at 08:28

## 2017-01-01 RX ADMIN — LEVETIRACETAM 1000 MG: 500 TABLET ORAL at 20:21

## 2017-01-01 RX ADMIN — SODIUM CHLORIDE: 9 INJECTION, SOLUTION INTRAVENOUS at 06:24

## 2017-01-01 RX ADMIN — OXYCODONE HYDROCHLORIDE 5 MG: 5 TABLET ORAL at 10:02

## 2017-01-01 RX ADMIN — LEVETIRACETAM 1000 MG: 500 TABLET ORAL at 08:28

## 2017-01-01 RX ADMIN — LISINOPRIL 20 MG: 10 TABLET ORAL at 08:43

## 2017-01-01 RX ADMIN — LEVETIRACETAM 1000 MG: 500 TABLET ORAL at 19:52

## 2017-01-01 RX ADMIN — OXYCODONE HYDROCHLORIDE 5 MG: 5 TABLET ORAL at 06:09

## 2017-01-01 RX ADMIN — FEXOFENADINE HYDROCHLORIDE 180 MG: 180 TABLET, FILM COATED ORAL at 08:43

## 2017-01-01 RX ADMIN — OXYCODONE HYDROCHLORIDE 5 MG: 5 TABLET ORAL at 00:30

## 2017-01-01 RX ADMIN — SODIUM CHLORIDE: 9 INJECTION, SOLUTION INTRAVENOUS at 16:00

## 2017-01-01 RX ADMIN — PROCHLORPERAZINE EDISYLATE 5 MG: 5 INJECTION INTRAMUSCULAR; INTRAVENOUS at 01:13

## 2017-01-01 RX ADMIN — FEXOFENADINE HYDROCHLORIDE 180 MG: 180 TABLET, FILM COATED ORAL at 18:17

## 2017-01-01 RX ADMIN — OXYCODONE HYDROCHLORIDE 5 MG: 5 TABLET ORAL at 16:39

## 2017-01-01 RX ADMIN — POTASSIUM CHLORIDE 40 MEQ: 750 TABLET, EXTENDED RELEASE ORAL at 10:42

## 2017-01-01 RX ADMIN — Medication 1 CAPSULE: at 09:11

## 2017-01-01 RX ADMIN — ONDANSETRON 4 MG: 2 INJECTION INTRAMUSCULAR; INTRAVENOUS at 12:59

## 2017-01-01 RX ADMIN — MONTELUKAST SODIUM 10 MG: 10 TABLET, FILM COATED ORAL at 20:25

## 2017-01-01 RX ADMIN — OXYCODONE HYDROCHLORIDE 5 MG: 5 TABLET ORAL at 12:21

## 2017-01-01 RX ADMIN — LEVETIRACETAM 1000 MG: 500 TABLET ORAL at 08:43

## 2017-01-01 RX ADMIN — FENTANYL CITRATE 25 MCG: 50 INJECTION, SOLUTION INTRAMUSCULAR; INTRAVENOUS at 18:50

## 2017-01-01 RX ADMIN — ONDANSETRON 4 MG: 2 INJECTION INTRAMUSCULAR; INTRAVENOUS at 14:20

## 2017-01-01 RX ADMIN — PROCHLORPERAZINE EDISYLATE 5 MG: 5 INJECTION INTRAMUSCULAR; INTRAVENOUS at 02:20

## 2017-01-01 RX ADMIN — Medication 1 CAPSULE: at 09:57

## 2017-01-01 RX ADMIN — ACETAMINOPHEN 650 MG: 325 TABLET, FILM COATED ORAL at 10:02

## 2017-01-01 RX ADMIN — OXYCODONE HYDROCHLORIDE 5 MG: 5 TABLET ORAL at 21:24

## 2017-01-01 RX ADMIN — OXYCODONE HYDROCHLORIDE 5 MG: 5 TABLET ORAL at 15:58

## 2017-01-01 RX ADMIN — LISINOPRIL 20 MG: 10 TABLET ORAL at 09:02

## 2017-01-01 RX ADMIN — SODIUM CHLORIDE: 9 INJECTION, SOLUTION INTRAVENOUS at 20:22

## 2017-01-01 RX ADMIN — MONTELUKAST SODIUM 10 MG: 10 TABLET, FILM COATED ORAL at 21:25

## 2017-01-01 RX ADMIN — ACETAMINOPHEN 650 MG: 325 TABLET, FILM COATED ORAL at 06:09

## 2017-01-01 RX ADMIN — SERTRALINE HYDROCHLORIDE 100 MG: 100 TABLET ORAL at 19:58

## 2017-01-01 RX ADMIN — Medication 0.5 MG: at 06:31

## 2017-01-01 RX ADMIN — LEVETIRACETAM 1000 MG: 500 TABLET ORAL at 09:57

## 2017-01-01 RX ADMIN — Medication 1 TABLET: at 09:11

## 2017-01-01 RX ADMIN — SULFAMETHOXAZOLE AND TRIMETHOPRIM 1 TABLET: 800; 160 TABLET ORAL at 08:28

## 2017-01-01 RX ADMIN — LISINOPRIL 10 MG: 10 TABLET ORAL at 21:24

## 2017-01-01 RX ADMIN — MONTELUKAST SODIUM 10 MG: 10 TABLET, FILM COATED ORAL at 19:52

## 2017-01-01 RX ADMIN — LISINOPRIL 10 MG: 10 TABLET ORAL at 20:22

## 2017-01-01 RX ADMIN — CALCIUM CARBONATE-VITAMIN D TAB 500 MG-200 UNIT 1 TABLET: 500-200 TAB at 08:28

## 2017-01-01 RX ADMIN — SODIUM CHLORIDE: 9 INJECTION, SOLUTION INTRAVENOUS at 02:09

## 2017-01-01 RX ADMIN — ONDANSETRON 4 MG: 4 TABLET, ORALLY DISINTEGRATING ORAL at 15:58

## 2017-01-01 RX ADMIN — LEVETIRACETAM 1000 MG: 500 TABLET ORAL at 21:25

## 2017-01-01 RX ADMIN — PHYTONADIONE 10 MG: 10 INJECTION, EMULSION INTRAMUSCULAR; INTRAVENOUS; SUBCUTANEOUS at 21:07

## 2017-01-01 RX ADMIN — ONDANSETRON 4 MG: 2 INJECTION INTRAMUSCULAR; INTRAVENOUS at 19:42

## 2017-01-01 RX ADMIN — SENNOSIDES AND DOCUSATE SODIUM 1 TABLET: 8.6; 5 TABLET ORAL at 20:21

## 2017-01-01 RX ADMIN — OXYCODONE HYDROCHLORIDE 5 MG: 5 TABLET ORAL at 16:18

## 2017-01-01 RX ADMIN — ONDANSETRON 4 MG: 2 INJECTION INTRAMUSCULAR; INTRAVENOUS at 21:20

## 2017-01-01 RX ADMIN — LOVASTATIN 40 MG: 20 TABLET ORAL at 21:24

## 2017-01-01 RX ADMIN — ACETAMINOPHEN 650 MG: 325 TABLET, FILM COATED ORAL at 19:31

## 2017-01-01 RX ADMIN — Medication 5 MG: at 17:41

## 2017-01-01 RX ADMIN — CALCIUM CARBONATE-VITAMIN D TAB 500 MG-200 UNIT 1 TABLET: 500-200 TAB at 08:14

## 2017-01-01 RX ADMIN — FENTANYL CITRATE 12.5 MCG: 50 INJECTION, SOLUTION INTRAMUSCULAR; INTRAVENOUS at 18:41

## 2017-01-01 RX ADMIN — Medication 1 TABLET: at 08:28

## 2017-01-01 RX ADMIN — LEVETIRACETAM 1000 MG: 500 TABLET ORAL at 19:58

## 2017-01-01 RX ADMIN — ACETAMINOPHEN 650 MG: 325 TABLET, FILM COATED ORAL at 14:03

## 2017-01-01 RX ADMIN — SENNOSIDES AND DOCUSATE SODIUM 1 TABLET: 8.6; 5 TABLET ORAL at 09:02

## 2017-01-01 RX ADMIN — LEVETIRACETAM 1000 MG: 500 TABLET ORAL at 09:02

## 2017-01-01 RX ADMIN — ONDANSETRON 4 MG: 2 INJECTION INTRAMUSCULAR; INTRAVENOUS at 14:51

## 2017-01-01 RX ADMIN — CALCIUM CARBONATE-VITAMIN D TAB 500 MG-200 UNIT 1 TABLET: 500-200 TAB at 09:02

## 2017-01-01 RX ADMIN — LEVOFLOXACIN 500 MG: 5 INJECTION, SOLUTION INTRAVENOUS at 15:50

## 2017-01-01 RX ADMIN — Medication 0.5 MG: at 16:35

## 2017-01-01 RX ADMIN — LISINOPRIL 10 MG: 10 TABLET ORAL at 19:58

## 2017-01-01 RX ADMIN — PROCHLORPERAZINE EDISYLATE 5 MG: 5 INJECTION INTRAMUSCULAR; INTRAVENOUS at 16:35

## 2017-01-01 RX ADMIN — CALCIUM CARBONATE-VITAMIN D TAB 500 MG-200 UNIT 1 TABLET: 500-200 TAB at 09:58

## 2017-01-01 RX ADMIN — ROCURONIUM BROMIDE 40 MG: 10 INJECTION INTRAVENOUS at 15:32

## 2017-01-01 RX ADMIN — ESMOLOL HYDROCHLORIDE 15 MG: 10 INJECTION, SOLUTION INTRAVENOUS at 17:33

## 2017-01-01 RX ADMIN — FENTANYL CITRATE 50 MCG: 50 INJECTION, SOLUTION INTRAMUSCULAR; INTRAVENOUS at 15:25

## 2017-01-01 RX ADMIN — LOVASTATIN 40 MG: 20 TABLET ORAL at 20:21

## 2017-01-01 RX ADMIN — FEXOFENADINE HYDROCHLORIDE 180 MG: 180 TABLET, FILM COATED ORAL at 08:14

## 2017-01-01 RX ADMIN — SENNOSIDES AND DOCUSATE SODIUM 1 TABLET: 8.6; 5 TABLET ORAL at 08:43

## 2017-01-01 RX ADMIN — SUGAMMADEX 150 MG: 100 INJECTION, SOLUTION INTRAVENOUS at 17:48

## 2017-01-01 RX ADMIN — OXYCODONE HYDROCHLORIDE 5 MG: 5 TABLET ORAL at 01:12

## 2017-01-01 RX ADMIN — ACETAMINOPHEN 650 MG: 325 TABLET, FILM COATED ORAL at 01:12

## 2017-01-01 RX ADMIN — OXYCODONE HYDROCHLORIDE 5 MG: 5 TABLET ORAL at 20:52

## 2017-01-01 RX ADMIN — PROPOFOL 120 MG: 10 INJECTION, EMULSION INTRAVENOUS at 15:32

## 2017-01-01 RX ADMIN — MONTELUKAST SODIUM 10 MG: 10 TABLET, FILM COATED ORAL at 20:52

## 2017-01-01 RX ADMIN — LOVASTATIN 40 MG: 20 TABLET ORAL at 20:25

## 2017-01-01 RX ADMIN — OXYCODONE HYDROCHLORIDE 5 MG: 5 TABLET ORAL at 02:14

## 2017-01-01 RX ADMIN — ONDANSETRON 4 MG: 2 INJECTION INTRAMUSCULAR; INTRAVENOUS at 20:46

## 2017-01-01 RX ADMIN — ONDANSETRON 4 MG: 2 INJECTION INTRAMUSCULAR; INTRAVENOUS at 05:23

## 2017-01-01 RX ADMIN — ACETAMINOPHEN 650 MG: 325 TABLET, FILM COATED ORAL at 17:57

## 2017-01-01 RX ADMIN — Medication 1 CAPSULE: at 08:14

## 2017-01-01 RX ADMIN — Medication 1 TABLET: at 08:43

## 2017-01-01 RX ADMIN — ACETAMINOPHEN 650 MG: 325 TABLET, FILM COATED ORAL at 02:14

## 2017-01-01 RX ADMIN — SULFAMETHOXAZOLE AND TRIMETHOPRIM 1 TABLET: 800; 160 TABLET ORAL at 09:58

## 2017-01-01 RX ADMIN — SERTRALINE HYDROCHLORIDE 100 MG: 100 TABLET ORAL at 18:16

## 2017-01-01 RX ADMIN — Medication 0.5 MG: at 00:14

## 2017-01-01 RX ADMIN — POTASSIUM CHLORIDE 20 MEQ: 750 TABLET, EXTENDED RELEASE ORAL at 13:42

## 2017-01-01 RX ADMIN — LISINOPRIL 20 MG: 10 TABLET ORAL at 08:14

## 2017-01-01 RX ADMIN — OMEPRAZOLE 20 MG: 20 CAPSULE, DELAYED RELEASE ORAL at 08:43

## 2017-01-01 RX ADMIN — SODIUM CHLORIDE: 9 INJECTION, SOLUTION INTRAVENOUS at 09:12

## 2017-01-01 RX ADMIN — LEVETIRACETAM 1000 MG: 500 TABLET ORAL at 08:14

## 2017-01-01 RX ADMIN — FEXOFENADINE HYDROCHLORIDE 180 MG: 180 TABLET, FILM COATED ORAL at 09:56

## 2017-01-01 RX ADMIN — Medication 0.5 MG: at 07:51

## 2017-01-01 RX ADMIN — SERTRALINE HYDROCHLORIDE 100 MG: 100 TABLET ORAL at 20:21

## 2017-01-01 RX ADMIN — SULFAMETHOXAZOLE AND TRIMETHOPRIM 1 TABLET: 800; 160 TABLET ORAL at 19:58

## 2017-01-01 RX ADMIN — OMEPRAZOLE 20 MG: 20 CAPSULE, DELAYED RELEASE ORAL at 08:14

## 2017-01-01 RX ADMIN — ONDANSETRON 4 MG: 2 INJECTION INTRAMUSCULAR; INTRAVENOUS at 00:29

## 2017-01-01 RX ADMIN — OXYCODONE HYDROCHLORIDE 5 MG: 5 TABLET ORAL at 07:55

## 2017-01-01 RX ADMIN — FEXOFENADINE HYDROCHLORIDE 180 MG: 180 TABLET, FILM COATED ORAL at 08:28

## 2017-01-01 RX ADMIN — MONTELUKAST SODIUM 10 MG: 10 TABLET, FILM COATED ORAL at 20:21

## 2017-01-01 RX ADMIN — FENTANYL CITRATE 12.5 MCG: 50 INJECTION, SOLUTION INTRAMUSCULAR; INTRAVENOUS at 18:39

## 2017-01-01 RX ADMIN — SENNOSIDES AND DOCUSATE SODIUM 1 TABLET: 8.6; 5 TABLET ORAL at 08:14

## 2017-01-01 RX ADMIN — Medication 0.3 MG: at 14:20

## 2017-01-01 RX ADMIN — SULFAMETHOXAZOLE AND TRIMETHOPRIM 1 TABLET: 800; 160 TABLET ORAL at 21:24

## 2017-01-01 RX ADMIN — ESMOLOL HYDROCHLORIDE 15 MG: 10 INJECTION, SOLUTION INTRAVENOUS at 17:57

## 2017-01-01 RX ADMIN — Medication 0.5 MG: at 09:36

## 2017-01-01 RX ADMIN — Medication 1 CAPSULE: at 08:28

## 2017-01-01 RX ADMIN — Medication 1 TABLET: at 08:14

## 2017-01-01 RX ADMIN — Medication 1 CAPSULE: at 08:43

## 2017-01-01 RX ADMIN — Medication 0.5 MG: at 13:00

## 2017-01-01 RX ADMIN — OXYCODONE HYDROCHLORIDE 5 MG: 5 TABLET ORAL at 12:53

## 2017-01-01 RX ADMIN — FENTANYL CITRATE 25 MCG: 50 INJECTION, SOLUTION INTRAMUSCULAR; INTRAVENOUS at 17:52

## 2017-01-01 RX ADMIN — PROCHLORPERAZINE EDISYLATE 5 MG: 5 INJECTION INTRAMUSCULAR; INTRAVENOUS at 09:36

## 2017-01-01 RX ADMIN — LISINOPRIL 20 MG: 10 TABLET ORAL at 09:57

## 2017-01-01 RX ADMIN — ACETAMINOPHEN 650 MG: 325 TABLET, FILM COATED ORAL at 13:46

## 2017-01-01 RX ADMIN — SENNOSIDES AND DOCUSATE SODIUM 1 TABLET: 8.6; 5 TABLET ORAL at 19:58

## 2017-01-01 RX ADMIN — OXYCODONE HYDROCHLORIDE 5 MG: 5 TABLET ORAL at 11:54

## 2017-01-01 RX ADMIN — ONDANSETRON 4 MG: 2 INJECTION INTRAMUSCULAR; INTRAVENOUS at 20:52

## 2017-01-01 RX ADMIN — CALCIUM CARBONATE-VITAMIN D TAB 500 MG-200 UNIT 1 TABLET: 500-200 TAB at 08:43

## 2017-01-01 RX ADMIN — LISINOPRIL 20 MG: 10 TABLET ORAL at 08:28

## 2017-01-01 RX ADMIN — ONDANSETRON 4 MG: 4 TABLET, ORALLY DISINTEGRATING ORAL at 07:56

## 2017-01-01 RX ADMIN — SODIUM CHLORIDE: 9 INJECTION, SOLUTION INTRAVENOUS at 21:08

## 2017-01-01 RX ADMIN — OXYCODONE HYDROCHLORIDE 5 MG: 5 TABLET ORAL at 14:03

## 2017-01-01 RX ADMIN — FEXOFENADINE HYDROCHLORIDE 180 MG: 180 TABLET, FILM COATED ORAL at 09:02

## 2017-01-01 RX ADMIN — LISINOPRIL 10 MG: 10 TABLET ORAL at 20:23

## 2017-01-01 RX ADMIN — SENNOSIDES AND DOCUSATE SODIUM 1 TABLET: 8.6; 5 TABLET ORAL at 18:17

## 2017-01-01 RX ADMIN — SODIUM CHLORIDE: 9 INJECTION, SOLUTION INTRAVENOUS at 02:07

## 2017-01-01 RX ADMIN — SERTRALINE HYDROCHLORIDE 100 MG: 100 TABLET ORAL at 21:25

## 2017-01-01 RX ADMIN — Medication 0.5 MG: at 01:18

## 2017-01-01 RX ADMIN — OXYCODONE HYDROCHLORIDE 5 MG: 5 TABLET ORAL at 20:21

## 2017-01-01 RX ADMIN — FENTANYL CITRATE 50 MCG: 50 INJECTION, SOLUTION INTRAMUSCULAR; INTRAVENOUS at 16:51

## 2017-01-01 RX ADMIN — ONDANSETRON 4 MG: 2 INJECTION INTRAMUSCULAR; INTRAVENOUS at 06:38

## 2017-01-01 RX ADMIN — FENTANYL CITRATE 50 MCG: 50 INJECTION, SOLUTION INTRAMUSCULAR; INTRAVENOUS at 15:30

## 2017-01-01 RX ADMIN — SODIUM CHLORIDE: 9 INJECTION, SOLUTION INTRAVENOUS at 00:15

## 2017-01-01 RX ADMIN — OMEPRAZOLE 20 MG: 20 CAPSULE, DELAYED RELEASE ORAL at 09:57

## 2017-01-01 RX ADMIN — OXYCODONE HYDROCHLORIDE 10 MG: 5 TABLET ORAL at 12:53

## 2017-01-01 RX ADMIN — LEVETIRACETAM 1000 MG: 500 TABLET ORAL at 20:23

## 2017-01-01 RX ADMIN — SODIUM CHLORIDE, POTASSIUM CHLORIDE, SODIUM LACTATE AND CALCIUM CHLORIDE: 600; 310; 30; 20 INJECTION, SOLUTION INTRAVENOUS at 15:25

## 2017-01-01 RX ADMIN — ONDANSETRON 4 MG: 4 TABLET, ORALLY DISINTEGRATING ORAL at 12:21

## 2017-01-01 RX ADMIN — ACETAMINOPHEN 650 MG: 325 TABLET, FILM COATED ORAL at 00:30

## 2017-01-01 ASSESSMENT — ENCOUNTER SYMPTOMS
DIARRHEA: 1
DIARRHEA: 0
COLOR CHANGE: 1
DYSURIA: 0
DIARRHEA: 1
WEIGHT LOSS: 1
DIARRHEA: 0
ABDOMINAL PAIN: 1
TROUBLE SWALLOWING: 0
VOMITING: 0
EYE PAIN: 0
DYSURIA: 0
DIZZINESS: 0
COUGH: 0
NAUSEA: 1
VOMITING: 1
PALPITATIONS: 0
NAUSEA: 1
CHILLS: 0
DIZZINESS: 0
WHEEZING: 0
DIARRHEA: 0
CONFUSION: 0
APPETITE CHANGE: 1
COUGH: 0
WHEEZING: 0
JOINT SWELLING: 0
SHORTNESS OF BREATH: 0
CHILLS: 0
COUGH: 0
BLURRED VISION: 0
COUGH: 0
FEVER: 0
HEADACHES: 0
BACK PAIN: 0
FEVER: 0
CHILLS: 0
DYSURIA: 0
ABDOMINAL PAIN: 1
SORE THROAT: 0
EYE REDNESS: 0
VOMITING: 0
ABDOMINAL PAIN: 0
DIAPHORESIS: 0
VOMITING: 0
AGITATION: 0
TROUBLE SWALLOWING: 0
JOINT SWELLING: 0
COUGH: 0
DYSURIA: 0
NAUSEA: 0
SHORTNESS OF BREATH: 0
HEADACHES: 0
FLANK PAIN: 0
FEVER: 0
CONFUSION: 0
COLOR CHANGE: 1
FLANK PAIN: 0
SEIZURES: 0
FLANK PAIN: 0
AGITATION: 0
TROUBLE SWALLOWING: 0
CHILLS: 0
VOMITING: 0
CONFUSION: 0
DIAPHORESIS: 0
NAUSEA: 1
MYALGIAS: 0
SINUS PAIN: 0
CHILLS: 0
NAUSEA: 1
ROS SKIN COMMENTS: JAUNDICE
FATIGUE: 1
WHEEZING: 0
DYSURIA: 0
APPETITE CHANGE: 1
HEADACHES: 0
AGITATION: 0
FLANK PAIN: 0
DIZZINESS: 0
TROUBLE SWALLOWING: 0
WHEEZING: 0
ABDOMINAL PAIN: 1
NECK STIFFNESS: 0
NECK STIFFNESS: 0
ABDOMINAL PAIN: 1
DIZZINESS: 0
FEVER: 0
DIAPHORESIS: 0
SHORTNESS OF BREATH: 0
SHORTNESS OF BREATH: 0
FATIGUE: 1
POLYDIPSIA: 0
HEADACHES: 0
AGITATION: 0
DIAPHORESIS: 0
FEVER: 0
BACK PAIN: 1
CONSTIPATION: 0
NAUSEA: 0
DYSURIA: 0
JOINT SWELLING: 0
SHORTNESS OF BREATH: 0
FLANK PAIN: 0
FEVER: 0
JOINT SWELLING: 0
VOMITING: 1
DIARRHEA: 0
CHILLS: 0
APPETITE CHANGE: 1
WEAKNESS: 1
HEADACHES: 0
CONSTIPATION: 0
CONFUSION: 0
POLYDIPSIA: 0
NECK STIFFNESS: 0
PALPITATIONS: 0
SHORTNESS OF BREATH: 0
ABDOMINAL PAIN: 1
NECK STIFFNESS: 0
POLYDIPSIA: 0
INSOMNIA: 1
FREQUENCY: 0
FATIGUE: 1
POLYDIPSIA: 0

## 2017-01-01 ASSESSMENT — PAIN DESCRIPTION - DESCRIPTORS
DESCRIPTORS: ACHING;CONSTANT
DESCRIPTORS: SORE

## 2017-10-11 NOTE — PROGRESS NOTES
Received message from inpatient staff that PCP wants this patient to be seen by Dr. Vargas.   Called referring office to have them start the referral by calling the referral line. Advised will need all the records and imaging to be reviewed prior to any decision to schedule this patient.   Verbalized understanding.     10/13/2017: returned call to Darline: Left message for her and Dr. Salazar asking to clarify who they want this patient to be seen by. Pancreas/biliary or general GI as this RN see's there is a referral to general GI- see notes started by Dr. Beal.     Admaaris CROWLEY, RN Coordinator  Dr. Vargas, Dr. Vanegas & Dr. Araya   Advanced Endoscopy  [phone]

## 2017-10-12 NOTE — PROGRESS NOTES
GI notes or primary provider notes related to GI problem   Y    Pathology reports N    Recent Lab  Reports Y    Radiology Reports (CT?MRI) Y    Endoscopy Y    Colonoscopy N    Referring GI Physician Name     Referring PCP Name  Tamar Salazar MD    Notes:epigatric pain elevated alkaline phosphatase    Referral Date 10/12/17    Date Complete Records Received 10/12/17     Date records scanned into epic 10/12/17    Provider Review Date     Date review routed back to Erika     Letter sent       Notes

## 2017-10-30 NOTE — ED AVS SNAPSHOT
[facility], [place], Emergency Department    [address] [address]    Phone:  [phone]                                       Stephanie Haddad   MRN: [id number]    Department:  [facility], [place], Emergency Department   Date of Visit:  10/30/2017           Patient Information     Date Of Birth          1940        Your diagnoses for this visit were:     Abdominal pain, generalized        You were seen by Javier Arenas MD.      Follow-up Information     Follow up with Tamar Salazar.    Specialty:  Internal Medicine    Contact information:    [facility]  [address] [facility] [address]  [phone]          Discharge Instructions         Abdominal Pain    Abdominal pain is pain in the stomach or belly area. Everyone has this pain from time to time. In many cases it goes away on its own. But abdominal pain can sometimes be due to a serious problem, such as appendicitis. So it s important to know when to seek help.  Causes of abdominal pain  There are many possible causes of abdominal pain. Common causes in adults include:    Constipation, diarrhea, or gas    Stomach acid flowing back up into the esophagus (acid reflux or heartburn)    Severe acid reflux, called GERD (gastroesophageal reflux disease)    A sore in the lining of the stomach or small intestine (peptic ulcer)    Inflammation of the gallbladder, liver, or pancreas    Gallstones or kidney stones    Appendicitis     Intestinal blockage     An internal organ pushing through a muscle or other tissue (hernia)    Urinary tract infections    In women, menstrual cramps, fibroids, or endometriosis    Inflammation or infection of the intestines  Diagnosing the cause of abdominal pain  Your healthcare provider will do a physical exam help find the cause of your pain. If needed, tests will be ordered. Belly pain has many possible causes. So it can be hard to find the reason for your pain. Giving details about your pain can help. Tell your  provider where and when you feel the pain, and what makes it better or worse. Also let your provider know if you have other symptoms such as:    Fever    Tiredness    Upset stomach (nausea)    Vomiting    Changes in bathroom habits  Treating abdominal pain  Some causes of pain need emergency medical treatment right away. These include appendicitis or a bowel blockage. Other problems can be treated with rest, fluids, or medicines. Your healthcare provider can give you specific instructions for treatment or self-care based on what is causing your pain.  If you have vomiting or diarrhea, sip water or other clear fluids. When you are ready to eat solid foods again, start with small amounts of easy-to-digest, low-fat foods. These include apple sauce, toast, or crackers.   When to seek medical care  Call 911 or go to the hospital right away if you:    Can t pass stool and are vomiting    Are vomiting blood or have bloody diarrhea or black, tarry diarrhea    Have chest, neck, or shoulder pain    Feel like you might pass out    Have pain in your shoulder blades with nausea    Have sudden, severe belly pain    Have new, severe pain unlike any you have felt before    Have a belly that is rigid, hard, and tender to touch  Call your healthcare provider if you have:    Pain for more than 5 days    Bloating for more than 2 days    Diarrhea for more than 5 days    A fever of 100.4 F (38.0 C) or higher, or as directed by your provider    Pain that gets worse    Weight loss for no reason    Continued lack of appetite    Blood in your stool  How to prevent abdominal pain  Here are some tips to help prevent abdominal pain:    Eat smaller amounts of food at one time.    Avoid greasy, fried, or other high-fat foods.    Avoid foods that give you gas.    Exercise regularly.    Drink plenty of fluids.  To help prevent GERD symptoms:    Quit smoking.    Reduce alcohol and certain foods that increase stomach acid.    Avoid aspirin and  over-the-counter pain and fever medicines (NSAIDS or nonsteroidal anti-inflammatory drugs), if possible    Lose extra weight.    Finish eating at least 2 hours before you go to bed or lie down.    Raise the head of your bed.  Date Last Reviewed: 7/1/2016 2000-2017 [employer]. [address] [place] [address]. All rights reserved. This information is not intended as a substitute for professional medical care. Always follow your healthcare professional's instructions.          24 Hour Appointment Hotline       To make an appointment at any [facility], call [phone] ([phone]). If you don't have a family doctor or clinic, we will help you find one. [facility] clinics are conveniently located to serve the needs of you and your family.             Review of your medicines      Our records show that you are taking the medicines listed below. If these are incorrect, please call your family doctor or clinic.        Dose / Directions Last dose taken    aspirin 325 MG tablet   Dose:  325 mg        Take 325 mg by mouth daily.   Refills:  0        CALCIUM-VITAMIN D PO        Take 2 daily   Refills:  0        fexofenadine 180 MG tablet   Commonly known as:  ALLEGRA   Dose:  180 mg        Take 180 mg by mouth daily.   Refills:  0        FOLBEE PO        Take 1 daily   Refills:  0        GLUCAGON DIAGNOSTIC IJ   Dose:  1 mg        Inject 1 mg as directed. 0.25 - 0.5 mg IV during MRI research scan, may repeat once for a max of 1mg IV.   Refills:  0        ibuprofen 600 MG tablet   Commonly known as:  ADVIL/MOTRIN   Dose:  600 mg   Quantity:  40 tablet        Take 1 tablet by mouth every 6 hours as needed (inflammatory pain).   Refills:  0        IRON PO        Time release take 2 daily   Refills:  0        lisinopril 10 MG tablet   Commonly known as:  PRINIVIL/ZESTRIL   Dose:  10 mg        Take 10 mg by mouth daily.   Refills:  0        lovastatin 40 MG tablet   Commonly known as:  MEVACOR    Dose:  40 mg        Take 40 mg by mouth At Bedtime.   Refills:  0        multivitamin Tabs tablet   Dose:  2 tablet        Take 2 tablets by mouth daily.   Refills:  0        NIACIN CR PO        Take  by mouth.   Refills:  0        NITROQUICK 0.4 MG sublingual tablet   Dose:  0.4 mg   Generic drug:  nitroGLYcerin        Place 0.4 mg under the tongue every 5 minutes as needed.   Refills:  0        omeprazole 20 MG CR capsule   Commonly known as:  priLOSEC   Dose:  20 mg        Take 20 mg by mouth   Refills:  0        oxyCODONE-acetaminophen 5-325 MG per tablet   Commonly known as:  PERCOCET   Dose:  1-2 tablet   Quantity:  30 tablet        Take 1-2 tablets by mouth every 6 hours as needed for pain.   Refills:  0        senna-docusate 8.6-50 MG per tablet   Commonly known as:  SENOKOT-S;PERICOLACE   Dose:  1 tablet   Quantity:  30 tablet        Take 1 tablet by mouth daily.   Refills:  0        SINGULAIR 10 MG tablet   Dose:  10 mg   Generic drug:  montelukast        Take 10 mg by mouth At Bedtime.   Refills:  0        temazepam 15 MG capsule   Commonly known as:  RESTORIL   Dose:  15 mg        Take 15 mg by mouth nightly as needed.   Refills:  0        traZODone 100 MG tablet   Commonly known as:  DESYREL   Dose:  100 mg        Take 100 mg by mouth At Bedtime.   Refills:  0        UNABLE TO FIND        Ascorbplex 1000 mg 2 times daily   Refills:  0        ZOLOFT 100 MG tablet   Dose:  100 mg   Generic drug:  sertraline        Take 100 mg by mouth daily.   Refills:  0                Procedures and tests performed during your visit     Abdomen XR, 2 vw, flat and upright    CBC with platelets differential    Comprehensive metabolic panel    Lactic acid whole blood    Lipase    UA reflex to Microscopic and Culture      Orders Needing Specimen Collection     None      Pending Results     No orders found from 10/28/2017 to 10/31/2017.            Pending Culture Results     No orders found from 10/28/2017 to 10/31/2017.  "           Pending Results Instructions     If you had any lab results that were not finalized at the time of your Discharge, you can call the ED Lab Result RN at [phone]. You will be contacted by this team for any positive Lab results or changes in treatment. The nurses are available 7 days a week from 10A to 6:30P.  You can leave a message 24 hours per day and they will return your call.        Thank you for choosing [facility]       Thank you for choosing [facility] for your care. Our goal is always to provide you with excellent care. Hearing back from our patients is one way we can continue to improve our services. Please take a few minutes to complete the written survey that you may receive in the mail after you visit with us. Thank you!        [employer] Information     [facility] lets you send messages to your doctor, view your test results, renew your prescriptions, schedule appointments and more. To sign up, go to [email or web address] . Click on \"Log in\" on the left side of the screen, which will take you to the Welcome page. Then click on \"Sign up Now\" on the right side of the page.     You will be asked to enter the access code listed below, as well as some personal information. Please follow the directions to create your username and password.     Your access code is: [email or web address]  Expires: 2018  2:08 PM     Your access code will  in 90 days. If you need help or a new code, please call your [facility] clinic or [phone].        Care EveryWhere ID     This is your Care EveryWhere ID. This could be used by other organizations to access your [facility] medical records  [id number]        Equal Access to Services     [facility] MedicineSOPHIA : Hadii prakash Patel, waaxda luqadaha, qaybhomer malagonalstefan fairchild. So [facility] [phone].    ATENCIÓN: Si habla español, tiene a gilmore disposición servicios gratuitos de asistencia lingüística. Llame al [phone].    We " comply with applicable federal civil rights laws and [place] laws. We do not discriminate on the basis of race, color, national origin, age, disability, sex, sexual orientation, or gender identity.            After Visit Summary       This is your record. Keep this with you and show to your community pharmacist(s) and doctor(s) at your next visit.

## 2017-10-30 NOTE — DISCHARGE INSTRUCTIONS

## 2017-10-30 NOTE — ED PROVIDER NOTES
History     Chief Complaint   Patient presents with     Abdominal Pain     HPI  Stephanie Haddad is a 77 year old female has a history of a colon resection and open appendectomy, cholecystectomy and gastroduodenal switch bypass who presents to the Emergency Department today for evaluation of abdominal pain. The patient states that she has been having this pain since May, 6 months ago. The patient has been going to an ER [facility] she is form. The patient was seen there 3 times and was recently told by her chiropractor to come to our ED and we could help her. She states that her pain has been a constant, aching pain for 5 months and it has become worse. It is at its worst during the day. She states that the pain radiates into her back on the right side. The pain in her back is described to be a burning pain. The patient has also been losing weight as she has been vomiting and have diarrhea. The patient has been trying to be seen by out GI specialist for a month now and has not heard back. The patient has been taking fentanyl and oxycodone for her pain but they have become less effective.     I have reviewed the Medications, Allergies, Past Medical and Surgical History, and Social History in the [facility] system.    Past Medical History:   Diagnosis Date     Angina      Asthma, chronic      Atherosclerosis      Colon cancer (H)      Depression      Diverticular disease of colon      HTN (hypertension)      Hyperlipidemia      Insomnia      Obesity      MARIBEL (obstructive sleep apnea)      PONV (postoperative nausea and vomiting)      Right ovarian cyst        Past Surgical History:   Procedure Laterality Date     APPENDECTOMY OPEN       ARTHROSCOPY KNEE BILATERAL       ARTHROSCOPY SHOULDER ROTATOR CUFF REPAIR       BYPASS GASTRIC DUODENAL SWITCH       CHOLECYSTECTOMY       COLECTOMY WITHOUT COLOSTOMY       LAPAROSCOPIC HYSTERECTOMY TOTAL, BILATERAL SALPINGO-OOPHORECTOMY, NODE DISSECTION, COMBINED  5/20/2011     Procedure: Open total abdominal hysterectomy and right salpingo-oophorectomy, lysis of adhesions; Surgeon:GERSON SILVA; Location:[facility] OR     PHACOEMULSIFICATION CLEAR CORNEA WITH STANDARD INTRAOCULAR LENS IMPLANT      bilateral     TONSILLECTOMY       uvuloplasty         No family history on file.    Social History   Substance Use Topics     Smoking status: Never Smoker     Smokeless tobacco: Never Used     Alcohol use No       No current facility-administered medications for this encounter.      Current Outpatient Prescriptions   Medication     lisinopril (PRINIVIL,ZESTRIL) 10 MG tablet     omeprazole (PRILOSEC) 20 MG CR capsule     oxycodone-acetaminophen (PERCOCET) 5-325 MG per tablet     ibuprofen (ADVIL,MOTRIN) 600 MG tablet     senna-docusate (SENOKOT-S;PERICOLACE) 8.6-50 MG per tablet     Glucagon rDNA, Diagnostic, (GLUCAGON DIAGNOSTIC IJ)     aspirin 325 MG tablet     CALCIUM-VITAMIN D PO     fexofenadine (ALLEGRA) 180 MG tablet     lovastatin (MEVACOR) 40 MG tablet     montelukast (SINGULAIR) 10 MG tablet     Folic Acid-Vit B6-Vit B12 (FOLBEE PO)     NIACIN CR PO     nitroGLYCERIN (NITROQUICK) 0.4 MG SL tablet     sertraline (ZOLOFT) 100 MG tablet     temazepam (RESTORIL) 15 MG capsule     TRAZodone (DESYREL) 100 MG tablet     UNABLE TO FIND     multivitamin (OCUVITE) TABS     IRON PO        Allergies   Allergen Reactions     Valium      Vicodin [Hydrocodone-Acetaminophen] Other (See Comments)     hallucination     Codeine Sulfate Hives     Demerol Hives     Morphine Hcl Itching     Ibuprofen-Oxycodone      Bad abdominal pain and black bowel      Review of Systems   Constitutional: Negative for chills and fever.   Respiratory: Negative for shortness of breath.    Cardiovascular: Negative for chest pain and palpitations.   Gastrointestinal: Positive for abdominal pain and diarrhea. Negative for constipation, nausea and vomiting.   Genitourinary: Negative for dysuria, flank pain and urgency.   Musculoskeletal:  Positive for back pain.   All other systems reviewed and are negative.      Physical Exam   BP: 132/90  Heart Rate: 89  Temp: 97.9  F (36.6  C)  Resp: 16  Weight: 74.8 kg (165 lb)  SpO2: 97 %      Physical Exam   Constitutional: She is oriented to person, place, and time. She appears well-developed and well-nourished.   HENT:   Head: Normocephalic and atraumatic.   Mouth/Throat: Oropharynx is clear and moist.   Eyes: Conjunctivae are normal.   Cardiovascular: Normal rate, regular rhythm and normal heart sounds.    Pulmonary/Chest: Effort normal. No respiratory distress. She has no wheezes. She has no rales.   Abdominal: Soft. She exhibits no distension. There is tenderness. There is no rebound and no guarding.   Musculoskeletal: She exhibits no edema.   Neurological: She is alert and oriented to person, place, and time. No cranial nerve deficit.   Skin: Skin is warm and dry. No rash noted. She is not diaphoretic.   Psychiatric: She has a normal mood and affect. Her behavior is normal.       ED Course   11:02 AM  The patient was seen and examined by Dr. Arenas in Room 19.    ED Course     Procedures             Labs Ordered and Resulted from Time of ED Arrival Up to the Time of Departure from the ED - No data to display         Assessments & Plan (with Medical Decision Making)   77-year-old female with history of prior NICHOLE complicated by development of healing adhesions. She is status post lysis of adhesions and exploratory laparotomy several years ago.  The patient now arrives at the direction of her chiropractor for 5 months of abdominal pain.  Upon arrival she is noted to be alert, she is currently afebrile and hemodynamically stable.  She is speaking in full sentences without evidence of increased work of breathing or cardiovascular compromise.  Based on the patient s story I have a low suspicion for cardiopulmonary disease such as ACS, aortic pathology or pulmonary embolus.  Evaluation of her abdomen reveals this  to be soft with no involuntary guarding and is nondistended.  It sounds as if she s had multiple CT scans through multiple ER visits over the past several weeks to months.  The patient has previous been seen by Gastroenterology but has been unable to connect with [facility].  I discussed options for emergent workup here at [facility]. We did obtain laboratory studies that demonstrate no significant leukocytosis, anemia, or abdominal pathology based on lab studies.  I would hold on repeat CT imaging at this time.  It sounds as though she has been on narcotics for pain control but minimizing doses as she does not want to become addicted.  I think for this patient balance would be reducing the risk of dependence and constipation, while maximizing pain management.  I have placed through a GI follow-up for the patient based on her request and her primary care physician request but I have recommended her continue to follow up with her primary care physician and local gastroenterologist awaiting this appointment to happen.    This part of the document was transcribed by Lianne Lambert Medical Scribe.      I have reviewed the nursing notes.    I have reviewed the findings, diagnosis, plan and need for follow up with the patient.    New Prescriptions    No medications on file       Final diagnoses:   Abdominal pain, generalized   ILeonardo, am serving as a trained medical scribe to document services personally performed by Javier Arenas MD, based on the provider's statements to me.   Javier ALMONTE MD, was physically present and have reviewed and verified the accuracy of this note documented by Leonardo Jenkins.     10/30/2017   [facility], [place], EMERGENCY DEPARTMENT     Javier Arenas MD  10/30/17 9626

## 2017-10-30 NOTE — ED AVS SNAPSHOT
[facility], [place], Emergency Department    [address] [place] [address]    Phone:  [phone]                                       Stephanie Haddad   MRN: [id number]    Department:  [facility], Emergency Department   Date of Visit:  10/30/2017           After Visit Summary Signature Page     I have received my discharge instructions, and my questions have been answered. I have discussed any challenges I see with this plan with the nurse or doctor.    ..........................................................................................................................................  Patient/Patient Representative Signature      ..........................................................................................................................................  Patient Representative Print Name and Relationship to Patient    ..................................................               ................................................  Date                                            Time    ..........................................................................................................................................  Reviewed by Signature/Title    ...................................................              ..............................................  Date                                                            Time

## 2017-11-01 NOTE — PROGRESS NOTES
Received call from referring office: asking why the patient has not been called with clinic date and time.   Referring office unable to cl;arify if this patient needs to be seen by general GI or advanced endoscopy. She just stated it was GI.   Advised we have not received any records on this patient to review. She was given fax number and asked to send in imaging.     Adamaris CROWLEY RN Coordinator  Dr. Vargas, Dr. Vanegas & Dr. Araya   Advanced Endoscopy  [phone]

## 2017-11-02 NOTE — PROGRESS NOTES
21 Pages of medical records received.  Hard copy folder created and handed over to RNCC.    SR 11.2.17  951a

## 2017-11-03 NOTE — PROGRESS NOTES
GI notes or primary provider notes related to GI problem   y    Pathology reports Y    Recent Lab  Reports Y    Radiology Reports (CT?MRI) Y    Endoscopy Y    Colonoscopy Y    Referring GI Physician Name     Referring PCP Name Tamar Salazar    Notes Epigastric pain    Referral Date 11/1/17    Date Complete Records Received 11/3/17    Date records scanned into epic 11/3/17    Provider Review Date     Date review routed back to Erika     Letter sent       Notes

## 2017-11-10 NOTE — PROGRESS NOTES
REFERRAL REVIEW FORM    Referred by: Dr. Tamar Salazar    Reason for referral: Epigastric pain    Date records reviewed: November 10, 2017    Previous work up:    Labs  EGD  Colonoscopy  CT scan  GI evaluation    Recommendation:    Schedule clinic appointment with general GI provider - 2nd opinion only.     When to schedule:  Next available slot    Date patient was contacted regarding scheduling:     Comments:   Initial referral apparently for SOD rule out - on further review, it sounds like the referring provider would just like any other GI etiology looked for, not a specific referral to rule out SOD.

## 2017-11-30 PROBLEM — G89.29 CHRONIC BILATERAL UPPER ABDOMINAL PAIN: Status: ACTIVE | Noted: 2017-01-01

## 2017-11-30 PROBLEM — R56.9 SEIZURE (H): Status: ACTIVE | Noted: 2017-01-01

## 2017-11-30 PROBLEM — R10.11 CHRONIC BILATERAL UPPER ABDOMINAL PAIN: Status: ACTIVE | Noted: 2017-01-01

## 2017-11-30 PROBLEM — G56.02 CARPAL TUNNEL SYNDROME OF LEFT WRIST: Status: ACTIVE | Noted: 2017-01-01

## 2017-11-30 PROBLEM — Z98.890 S/P EXPLORATORY LAPAROTOMY: Status: ACTIVE | Noted: 2017-01-01

## 2017-11-30 PROBLEM — Z98.84 S/P GASTRIC BYPASS: Status: ACTIVE | Noted: 2017-01-01

## 2017-11-30 PROBLEM — I67.83 PRES (POSTERIOR REVERSIBLE ENCEPHALOPATHY SYNDROME): Status: ACTIVE | Noted: 2017-01-01

## 2017-11-30 PROBLEM — R10.12 CHRONIC BILATERAL UPPER ABDOMINAL PAIN: Status: ACTIVE | Noted: 2017-01-01

## 2017-11-30 PROBLEM — R17 JAUNDICE: Status: ACTIVE | Noted: 2017-01-01

## 2017-11-30 NOTE — LETTER
Transition Communication Hand-off for Care Transitions to Next Level of Care Provider    Name: Stephanie Haddad  MRN #: [id number]  Primary Care Provider: [facility]     Primary [facility] [address]     Reason for Hospitalization:    Pancreatic mass  Pancreatic Cancer     Admit Date/Time: 11/30/2017  3:49 PM  Discharge Date: 12/5/2017    Payor Source: Payor: BCBS / Plan: BCBS PLATINUM BLUE / Product Type: PPO /       Key Recommendations:  Please review AVS    Teresa CRUZN RN PHN  Patient Care Management Coordinator  [facility] [address], and [address]  Phone: [phone] / Pager: [phone]      AVS/Discharge Summary is the source of truth; this is a helpful guide for improved communication of patient story

## 2017-11-30 NOTE — IP AVS SNAPSHOT
[address] [address]    Phone:  [phone]                                       After Visit Summary   11/30/2017    Stephanie Haddad    MRN: [id number]           After Visit Summary Signature Page     I have received my discharge instructions, and my questions have been answered. I have discussed any challenges I see with this plan with the nurse or doctor.    ..........................................................................................................................................  Patient/Patient Representative Signature      ..........................................................................................................................................  Patient Representative Print Name and Relationship to Patient    ..................................................               ................................................  Date                                            Time    ..........................................................................................................................................  Reviewed by Signature/Title    ...................................................              ..............................................  Date                                                            Time

## 2017-11-30 NOTE — IP AVS SNAPSHOT
MRN:[id number]                      After Visit Summary   11/30/2017    Stephanie Haddad    MRN: [id number]           Thank you!     Thank you for choosing [facility] for your care. Our goal is always to provide you with excellent care. Hearing back from our patients is one way we can continue to improve our services. Please take a few minutes to complete the written survey that you may receive in the mail after you visit with us. Thank you!        Patient Information     Date Of Birth          1940        Designated Caregiver       Most Recent Value    Caregiver    Will someone help with your care after discharge? 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  Procedure performed (gastrogastrostomy with stent placement) and symptoms improved.  Biopsy sent, final read pending.                  Who to Call     For medical emergencies, please call 911.  For non-urgent questions about your medical care, please call your primary care provider or clinic, None  For questions related to your surgery, please call your surgery clinic        Attending Provider     Provider Specialty    John Mae MD Internal Medicine    Cheng Velasco MD Family Medicine - Sports Medicine       Primary Care Provider Fax #    [facility] [phone]       When to contact your care team       Call your primary doctor if you have any of the following: temperature greater than 100.4, increased pain or nausea and vomiting and unable to keep down liquids.                  After Care Instructions     Activity       Your  activity upon discharge: activity as tolerated            Diet       Follow this diet upon discharge:     Regular Diet Adult            Discharge Instructions       Pathology results pending, can be discussed at your follow up appointment:    Follow up with your primary care provider on 12/8/17 @ 10 AM at [facility] for hospital follow up, repeat CMP and CBC, and to discuss final pathology results.      Follow up with oncology clinic, appointment scheduled on 12/14/17 at 9 AM at [address] [place] [address], [address] Floor, [place] [address].  Phone: [phone]                  Follow-up Appointments     Follow Up and recommended labs and tests       Follow up with your primary care provider on 12/8/17 @ 10 AM at [facility] for hospital follow up, repeat CMP and CBC, and to discuss final pathology results.    Follow up with oncology clinic, appointment scheduled on 12/14/17 at 9 AM at [address] [place] [address], [address] Floor, [place] [address].  Phone: [phone]                  Further instructions from your care team         Follow up with your primary care provider on 12/8/17 @ 10 AM at [facility] for hospital follow up, repeat CMP and CBC, and to discuss final pathology results.  [address] [place] [id number]  Please bring your insurance card, ID, and these discharge papers with you to this appointment.        Pending Results     Date and Time Order Name Status Description    12/3/2017 1538 Vitamin B6 In process     12/3/2017 1538 Vitamin A In process     12/3/2017 1100 Selenium In process     12/3/2017 1100 Zinc In process     12/3/2017 1100 Copper level In process     12/3/2017 1100 Methylmalonic acid In process             Statement of Approval     Ordered          12/05/17 1509  I have reviewed and agree with all the recommendations and orders detailed in this document.  EFFECTIVE NOW     Approved and electronically signed by:  Von Brooke MD             Admission Information     Date &  "Time Provider Department Dept. Phone    2017 Cheng Velasco MD [address] [facility] [phone]      Your Vitals Were     Blood Pressure Pulse Temperature Respirations Height Weight    148/46 (BP Location: Left arm) 81 98.1  F (36.7  C) (Oral) 16 1.524 m (5') 76.2 kg (167 lb 14.4 oz)    Pulse Oximetry BMI (Body Mass Index)                94% 32.79 kg/m2          [employer] Information     [employer] lets you send messages to your doctor, view your test results, renew your prescriptions, schedule appointments and more. To sign up, go to [email or web address] . Click on \"Log in\" on the left side of the screen, which will take you to the Welcome page. Then click on \"Sign up Now\" on the right side of the page.     You will be asked to enter the access code listed below, as well as some personal information. Please follow the directions to create your username and password.     Your access code is: [email or web address]  Expires: 2018  1:08 PM     Your access code will  in 90 days. If you need help or a new code, please call your [facility] clinic or [phone].        Care EveryWhere ID     This is your Care EveryWhere ID. This could be used by other organizations to access your [facility] medical records  [id number]        Equal Access to Services     ARNEL CASTANEDA AH: Hadii prakash bartholomewo Sorebecca, waaxda luqadaha, qaybta kaalmada ademaritayada, stefan whipple. [facility] [phone].    ATENCIÓN: Si habla español, tiene a gilmore disposición servicios gratuitos de asistencia lingüística. Llame al [phone].    We comply with applicable federal civil rights laws and [place] laws. We do not discriminate on the basis of race, color, national origin, age, disability, sex, sexual orientation, or gender identity.               Review of your medicines      START taking        Dose / Directions    levETIRAcetam 1000 MG Tabs        Dose:  1000 mg   Take 1,000 mg by mouth 2 times daily "   Quantity:  60 tablet   Refills:  0       ondansetron 4 MG ODT tab   Commonly known as:  ZOFRAN-ODT        Dose:  4 mg   Take 1 tablet (4 mg) by mouth every 6 hours as needed for nausea or vomiting   Quantity:  90 tablet   Refills:  0         CONTINUE these medicines which have NOT CHANGED        Dose / Directions    aspirin 325 MG tablet        Dose:  325 mg   Take 325 mg by mouth daily.   Refills:  0       CALCIUM-VITAMIN D PO        Take 2 daily   Refills:  0       fexofenadine 180 MG tablet   Commonly known as:  ALLEGRA        Dose:  180 mg   Take 180 mg by mouth daily.   Refills:  0       FOLBEE PO        Take 1 daily   Refills:  0       GLUCAGON DIAGNOSTIC IJ   Used for:  Ovarian cyst        Dose:  1 mg   Inject 1 mg as directed. 0.25 - 0.5 mg IV during MRI research scan, may repeat once for a max of 1mg IV.   Refills:  0       ibuprofen 600 MG tablet   Commonly known as:  ADVIL/MOTRIN   Used for:  Ovarian cyst        Dose:  600 mg   Take 1 tablet by mouth every 6 hours as needed (inflammatory pain).   Quantity:  40 tablet   Refills:  0       IRON PO        Time release take ONE daily   Refills:  0       lisinopril 10 MG tablet   Commonly known as:  PRINIVIL/ZESTRIL        Dose:  10 mg   Take 10 mg by mouth 2 times daily   Refills:  0       lovastatin 40 MG tablet   Commonly known as:  MEVACOR        Dose:  40 mg   Take 40 mg by mouth At Bedtime.   Refills:  0       multivitamin Tabs tablet        Dose:  1 tablet   Take 1 tablet by mouth daily   Refills:  0       NIACIN CR PO        Take  by mouth.   Refills:  0       NITROQUICK 0.4 MG sublingual tablet   Generic drug:  nitroGLYcerin        Dose:  0.4 mg   Place 0.4 mg under the tongue every 5 minutes as needed.   Refills:  0       omeprazole 20 MG CR capsule   Commonly known as:  priLOSEC        Dose:  20 mg   Take 20 mg by mouth   Refills:  0       oxyCODONE-acetaminophen 5-325 MG per tablet   Commonly known as:  PERCOCET   Used for:  Ovarian cyst         Dose:  1-2 tablet   Take 1-2 tablets by mouth every 6 hours as needed for pain.   Quantity:  30 tablet   Refills:  0       senna-docusate 8.6-50 MG per tablet   Commonly known as:  SENOKOT-S;PERICOLACE   Used for:  Ovarian cyst        Dose:  1 tablet   Take 1 tablet by mouth daily.   Quantity:  30 tablet   Refills:  0       SINGULAIR 10 MG tablet   Generic drug:  montelukast        Dose:  10 mg   Take 10 mg by mouth At Bedtime.   Refills:  0       temazepam 15 MG capsule   Commonly known as:  RESTORIL        Dose:  15 mg   Take 15 mg by mouth nightly as needed.   Refills:  0       UNABLE TO FIND        Ascorbplex 1000 mg 2 times daily   Refills:  0       ZOLOFT 100 MG tablet   Generic drug:  sertraline        Dose:  100 mg   Take 100 mg by mouth daily.   Refills:  0            Where to get your medicines      These medications were sent to [facility] [place], [place] - [address] [address]     Phone:  [phone]     levETIRAcetam 1000 MG Tabs    ondansetron 4 MG ODT tab                Protect others around you: Learn how to safely use, store and throw away your medicines at www.disposemymeds.org.             Medication List: This is a list of all your medications and when to take them. Check marks below indicate your daily home schedule. Keep this list as a reference.      Medications           Morning Afternoon Evening Bedtime As Needed    aspirin 325 MG tablet   Take 325 mg by mouth daily.                                CALCIUM-VITAMIN D PO   Take 2 daily   Last time this was given:  1 tablet on 12/5/2017  8:43 AM                                   fexofenadine 180 MG tablet   Commonly known as:  ALLEGRA   Take 180 mg by mouth daily.   Last time this was given:  180 mg on 12/5/2017  8:43 AM                                   FOLBEE PO   Take 1 daily                                GLUCAGON DIAGNOSTIC IJ   Inject 1 mg as directed. 0.25 - 0.5 mg IV during  MRI research scan, may repeat once for a max of 1mg IV.                                ibuprofen 600 MG tablet   Commonly known as:  ADVIL/MOTRIN   Take 1 tablet by mouth every 6 hours as needed (inflammatory pain).                                IRON PO   Time release take ONE daily                                levETIRAcetam 1000 MG Tabs   Take 1,000 mg by mouth 2 times daily   Last time this was given:  1,000 mg on 12/5/2017  8:43 AM   Next Dose Due:  8 pm 12/5                                   lisinopril 10 MG tablet   Commonly known as:  PRINIVIL/ZESTRIL   Take 10 mg by mouth 2 times daily   Last time this was given:  20 mg on 12/5/2017  8:43 AM                                   lovastatin 40 MG tablet   Commonly known as:  MEVACOR   Take 40 mg by mouth At Bedtime.   Last time this was given:  40 mg on 12/4/2017  8:21 PM                                multivitamin Tabs tablet   Take 1 tablet by mouth daily                                NIACIN CR PO   Take  by mouth.                                NITROQUICK 0.4 MG sublingual tablet   Place 0.4 mg under the tongue every 5 minutes as needed.   Generic drug:  nitroGLYcerin                                omeprazole 20 MG CR capsule   Commonly known as:  priLOSEC   Take 20 mg by mouth   Last time this was given:  20 mg on 12/5/2017  8:43 AM                                   ondansetron 4 MG ODT tab   Commonly known as:  ZOFRAN-ODT   Take 1 tablet (4 mg) by mouth every 6 hours as needed for nausea or vomiting   Last time this was given:  4 mg on 12/4/2017 12:21 PM   Next Dose Due:  12/5 at 9pm                                oxyCODONE-acetaminophen 5-325 MG per tablet   Commonly known as:  PERCOCET   Take 1-2 tablets by mouth every 6 hours as needed for pain.   Last time this was given:  12/5 at 2pm   Next Dose Due:  12/5 8pm                                   senna-docusate 8.6-50 MG per tablet   Commonly known as:  SENOKOT-S;PERICOLACE   Take 1 tablet by mouth  daily.   Last time this was given:  1 tablet on 12/5/2017  8:43 AM                                   SINGULAIR 10 MG tablet   Take 10 mg by mouth At Bedtime.   Last time this was given:  10 mg on 12/4/2017  8:21 PM   Generic drug:  montelukast                                temazepam 15 MG capsule   Commonly known as:  RESTORIL   Take 15 mg by mouth nightly as needed.                                UNABLE TO FIND   Ascorbplex 1000 mg 2 times daily                                ZOLOFT 100 MG tablet   Take 100 mg by mouth daily.   Last time this was given:  100 mg on 12/4/2017  8:21 PM   Generic drug:  sertraline

## 2017-11-30 NOTE — PROGRESS NOTES
[facility]  Transfer Triage Note    Date of call: 11/29/17  Time of call: 1000 AM    Reason for Transfer:Further diagnostic work up, management, and consultation for specialized care  Diagnosis: Lesion in pancreatic head     Outside Records: Not available  Additional records requested to be faxed to [phone].    Stability of Patient: Patient is at risk for instability, however patient requires urgent transfer and does not meet ICU criteria     Expected Time of Arrival for Transfer: 8-24 hours    Recommendations for Management and Stabilization: Not needed    Additional Comments   76 yo female with gastric bypass, history of colon cancer.  Presented with chronic abd pain over several months.  Has had normal MRCP, abd u/s, and CT months ago. Then recent was admitted in [place] and noted mesenteric defect? And had laparotomy in early Nov.  She was discharged.  Then later developed PRESS with seizures at [facility] and discharged.  Most recently she was found to be jaundice, elevated LFTs in the 200s. Repeat imaging including MRCP shows pancreatic head lesion.  Thus I had Dr. Vargas of GI on the phone call.  We both agreed that the patient she be transferred to the [facility] for further work up. Bariatric surgery will likely need to be involved with GI to see if they can take a look at the pancreatic lesion.    She is otherwise stable and we may not be able to find a bed until tomorrow.    John Mae MD

## 2017-11-30 NOTE — H&P
[facility]  Inpatient History and Physical    Stephanie Haddad MRN# [id number]   Age: 77 year old   YOB: 1940   Date of admission: 11/30/2017  Date of service: 11/30/2017.    Primary care provider: Tamar Salazar          Chief Concern:   Abdominal pain, nausea, vomiting, jaundice. Transfer from OSH for work up of pancreatic mass on CT       History is obtained from the patient and electronic health record          History of Present Illness (Resident / Clinician):   Stephanie Haddad is a 77 year old  female with a significant past medical history of multiple abdominal surgeries (see below), h/o colon cancer, h/o SBO, HTN, GERD, and HLD, who presents from OSH for further evaluation and management of biliary obstruction 2/2 mass in head of pancreas seen on CT.     Stephanie reports a h/o chronic abdominal pain, nausea, vomiting, loss of appetite, weight loss, and fatigue since May. She states she has lost at least 30lbs since May. Has had multiple EGDs performed (9/2015, 7/2017,11/2017) and an extensive abdominal surgery history. She was recently hospitalized 11/7-11/14 for acute on chronic abdominal pain with elevated CRP, was seen by gen surg due to concern for SBO and underwent ex lap with repair of a mesenteric defect and lysis of adhesion on 11/10. On 11/19 she then developed acute vision changes with loss of vision and was transferred urgently to  where she was diagnosed with PRES. At that time she was also noted to have seizure activity. She was discharged on 11/23 with increased lisinopril dose and was started on keppra for seizure activity.     Admitted to [facility] from 11/28-11/30 after being seen by her PCP for evaluation of increasing abdominal pain and jaundice. When evaluated in ED, labs were remarkable for WBC of 3.8, LA 2.6, K 5.3, Bili 8.5,  and , Alk phos greatly elevated at  2228. CT abd showed mass at head of the pancreas with dilation of the CBD and pancreatic duct. While hospitalized, and Abd US and MRCP were performed confirming mass. Case was discussed with GI Dr. Vargas at [facility] and patient was transferred for EUS with ERCP and biopsy of pancreatic mass.     Abdominal Surgical Hx:  Lula-en-y Gastric Bypass 2004  NICHOLE, MAGDALENA 5/20/2011  Appendectomy  Cholecystectomy 1978  Ex lap with repair bile leak 10//2015  Colectomy w/o colostomy 2001  Ex lap with lysis of adhesion 11/10/2017  EGD: 9/2015, 7/2017, 11/2017    Old records were reviewed, and any additions to pertinent history are noted above.  Authorization as obtained to review Care everywhere           Past Medical History:     Past Medical History:   Diagnosis Date     Angina      Asthma, chronic      Atherosclerosis      Colon cancer (H)      Depression      Diverticular disease of colon      HTN (hypertension)      Hyperlipidemia      Insomnia      Obesity      MARIBEL (obstructive sleep apnea)      PONV (postoperative nausea and vomiting)      Right ovarian cyst              Past Surgical History:     Past Surgical History:   Procedure Laterality Date     APPENDECTOMY OPEN       ARTHROSCOPY KNEE BILATERAL       ARTHROSCOPY SHOULDER ROTATOR CUFF REPAIR       BYPASS GASTRIC DUODENAL SWITCH  2004    Lula-en-y     CHOLECYSTECTOMY       COLECTOMY WITHOUT COLOSTOMY       LAPAROSCOPIC HYSTERECTOMY TOTAL, BILATERAL SALPINGO-OOPHORECTOMY, NODE DISSECTION, COMBINED  5/20/2011    Procedure: Open total abdominal hysterectomy and right salpingo-oophorectomy, lysis of adhesions; Surgeon:GERSON SILVA; Location:[facility] OR     LAPAROTOMY EXPLORATORY  11/10/2017    with lysis of adhesions     LAPAROTOMY EXPLORATORY  10/2015    with repair of bile duct leak     PHACOEMULSIFICATION CLEAR CORNEA WITH STANDARD INTRAOCULAR LENS IMPLANT      bilateral     TONSILLECTOMY       UPPER GI ENDOSCOPY  9/2015, 7/2017, 11/2017     uvuloplasty               Social  History:     Social History     Social History     Marital status: Single     Spouse name: N/A     Number of children: N/A     Years of education: N/A     Occupational History     Not on file.     Social History Main Topics     Smoking status: Never Smoker     Smokeless tobacco: Never Used     Alcohol use No     Drug use: No     Sexual activity: Not on file     Other Topics Concern     Not on file     Social History Narrative            Family History:   I have reviewed this patient's family history  Family History   Problem Relation Age of Onset     Breast Cancer Mother      Breast Cancer Maternal Aunt      Pancreatic Cancer Maternal Uncle      Family history reviewed    Patient reports history of breast cancer in mother and maternal aunt, pancreatic cancer in maternal uncle            Immunizations:   Immunizations are up to date          Allergies:   Valium; Vicodin [hydrocodone-acetaminophen]; Codeine sulfate; Demerol; Morphine hcl; and Ibuprofen-oxycodone          Medications:       No current facility-administered medications on file prior to encounter.   Current Outpatient Prescriptions on File Prior to Encounter:  omeprazole (PRILOSEC) 20 MG CR capsule Take 20 mg by mouth   ibuprofen (ADVIL,MOTRIN) 600 MG tablet Take 1 tablet by mouth every 6 hours as needed (inflammatory pain).   senna-docusate (SENOKOT-S;PERICOLACE) 8.6-50 MG per tablet Take 1 tablet by mouth daily.   aspirin 325 MG tablet Take 325 mg by mouth daily.   CALCIUM-VITAMIN D PO Take 2 daily   lisinopril (PRINIVIL,ZESTRIL) 10 MG tablet Take 10 mg by mouth 2 times daily    lovastatin (MEVACOR) 40 MG tablet Take 40 mg by mouth At Bedtime.   montelukast (SINGULAIR) 10 MG tablet Take 10 mg by mouth At Bedtime.   Folic Acid-Vit B6-Vit B12 (FOLBEE PO) Take 1 daily   sertraline (ZOLOFT) 100 MG tablet Take 100 mg by mouth daily.   temazepam (RESTORIL) 15 MG capsule Take 15 mg by mouth nightly as needed.   multivitamin (OCUVITE) TABS Take 1 tablet by  mouth daily    IRON PO Time release take ONE daily   oxycodone-acetaminophen (PERCOCET) 5-325 MG per tablet Take 1-2 tablets by mouth every 6 hours as needed for pain.   Glucagon rDNA, Diagnostic, (GLUCAGON DIAGNOSTIC IJ) Inject 1 mg as directed. 0.25 - 0.5 mg IV during MRI research scan, may repeat once for a max of 1mg IV.   fexofenadine (ALLEGRA) 180 MG tablet Take 180 mg by mouth daily.   NIACIN CR PO Take  by mouth.   nitroGLYCERIN (NITROQUICK) 0.4 MG SL tablet Place 0.4 mg under the tongue every 5 minutes as needed.   UNABLE TO FIND Ascorbplex 1000 mg 2 times daily            Review of Systems:   Review of Systems   Constitutional: Positive for malaise/fatigue and weight loss. Negative for chills and fever.   HENT: Negative for congestion, ear pain, sinus pain and sore throat.    Eyes: Negative for blurred vision, pain and redness.   Respiratory: Negative for cough and shortness of breath.    Cardiovascular: Negative for chest pain, palpitations and leg swelling.   Gastrointestinal: Positive for abdominal pain, diarrhea (now resolved, but experienced for about 2 months sometime in last 6month period), nausea and vomiting. Negative for constipation.   Genitourinary: Negative for dysuria, frequency and urgency.   Musculoskeletal: Negative for back pain, joint pain and myalgias.   Skin:        Jaundice     Neurological: Positive for weakness. Negative for seizures and headaches.   Psychiatric/Behavioral: The patient has insomnia (takes temazepam ).                  Physical Exam:   Vitals were reviewed  Temp: 96.7  F (35.9  C) Temp src: Oral BP: 140/56 Pulse: 77   Resp: 18 SpO2: 93 % O2 Device: None (Room air)     Physical Exam   Constitutional: She is oriented to person, place, and time. No distress.   HENT:   Head: Normocephalic and atraumatic.   Mouth/Throat: Oropharynx is clear and moist.   Eyes: Pupils are equal, round, and reactive to light. Scleral icterus is present.   Cardiovascular: Normal rate, regular  rhythm and normal heart sounds.  Exam reveals no friction rub.    No murmur heard.  Pulmonary/Chest: Effort normal. No respiratory distress. She has no wheezes. She has no rales.   Abdominal: Soft. She exhibits no distension. There is tenderness (mild tenderness of area of vertical ex lap incision scar). There is no guarding.   Midline incision scar, clean, dry, intact   Musculoskeletal: Normal range of motion. She exhibits no edema.   Lymphadenopathy:     She has no cervical adenopathy.   Neurological: She is alert and oriented to person, place, and time. She displays no weakness. No cranial nerve deficit.   Skin: She is not diaphoretic.   Jaundiced     Psychiatric: Mood, memory, affect and judgment normal.              Data:   No results found for this or any previous visit (from the past 24 hour(s)).   All cardiac studies reviewed by me.   All imaging studies reviewed by me.      11/30/17 0830   WBC   3.4 L  RBC   3.54 L   HGB   11.0 L   HCT   33.9 L   MCV   96  MCH   31.1   MCHC   32.4   PLT   223   MPV   11.9 H    SODIUM  141   POTASSIUM  3.7  CHLORIDE  111  AA4RCUQZ  23  BUN   6 L   CREATININE  0.78   GLUCOSE  141 H   CALCIUM  8.1 L   ALKPHOSPH  2331 H   PROTEIN  5.4 L   BILITOTAL  8.1 H   AST   403 H   ALT   217 H     INR   1.0   PLT   223     CT SCAN ABDOMEN AND PELVIS WITH CONTRAST 11/28/2017    CLINICAL DATA:   Abdominal pain. Recent small bowel obstruction status post laparoscopy 2-3   weeks ago. Now with new jaundice and diffuse abdominal pain.    COMPARISON:   11/03/2017    TECHNIQUE:   While obtaining CT images, dose reduction techniques were utilized including:    Automated exposure control    Adjustment of mA and/or kV according to patient size, and/or    Use of iterative reconstruction technique    FINDINGS:   Lung bases are clear other than dependent atelectasis. There is no pleural   or pericardial fluid.     ABDOMEN/PELVIS: There is diffuse decreased attenuation of the liver   consistent with  fatty infiltration. There has been interval development of   markedly dilated common bile duct measuring up to 1.4 cm in diameter.   There is a mildly dilated pancreatic duct which is increased in size since   the previous exam. There is a slightly hypoattenuating lesion within the   head of the pancreas which is more pronounced than on the previous exam   and measures approximately 13 x 13.2 cm in maximal transverse dimension on   S2:51. This may reflect focal pancreatitis; however, underlying lesion   within the pancreas is not excluded. This is not appreciated on the   previous exam however. There is intrahepatic biliary ductal dilatation   also noted.    Bowel loops are normal in caliber. There is no free air. There is a mild   amount of free fluid within the cul-de-sac. Perivesical and perirectal fat   planes are preserved. Uterus is absent.       COMPLETE ABDOMINAL ULTRASOUND 11/28/2017    CLINICAL DATA:   Abdominal pain and abnormal CT showing intrahepatic and extrahepatic   biliary ductal dilatation.    FINDINGS:   The gallbladder is removed. The common bile duct is markedly dilated at   1.7 cm. There is intrahepatic biliary ductal dilatation. The liver is   hyperechoic consistent with fatty infiltration. There are no focal lesions.     Within the head of the pancreas, there is a hypoechoic 2.3 x 1.5 x 2.2 cm   raising concern for pancreatic mass. This should be further evaluated with   endoscopic ultrasound at the time of ERCP.    Right kidney measures 10.2 x 3.9 x 5.6 cm and left kidney measures 9.7 x   4.9 x 4.2 cm. The spleen is normal in size. The aorta is unremarkable. The   inferior vena cava is patent.      MRCP 11/28/2017  CLINICAL DATA:   Abdominal pain and jaundice, biliary dilatation on recent CT scan.    COMPARISON:   None. CT scan of 11/28/2017 is available.    TECHNIQUE:   Axial and coronal T2-weighted images are obtained. High-resolution heavily  T2-weighted images are obtained through the  biliary system and 3-D MIPS  performed. No contrast given.    FINDINGS:   The biliary system is dilated, with a tortuous common bile duct. Common bile duct measures 17 mm in size. There is an abrupt termination of the common bile duct at the pancreatic head at the site of the mass seen on CT scan. The mass is not clearly seen on MRI. However, this study is not designed for pancreatic evaluation. The pancreatic duct is also dilated,   upstream from the pancreatic mass. No filling defects are seen within the biliary system or pancreatic duct. No definite choledocholithiasis.    The patient is status-post cholecystectomy. No definite focal hepatic lesions are noted. The spleen is normal in size. No evidence for adrenal mass. Extrarenal pelves are seen with peripelvic cysts. No definite obstructive uropathy. Abdominal aorta is normal in size. No retroperitoneal adenopathy. Loops of bowel appear within normal limits. Postoperative scar is seen in the anterior abdominal wall. Degenerative disc disease is seen throughout the lower thoracic and lumbar spine.    1. Intrahepatic and extrahepatic biliary ductal dilatation, consistent with the appearance of the CT scan. This is probably caused by pancreatic mass, not assessed on this study. The mass can be seen on the recent CT scan. Endoscopic ultrasound and biopsy recommended for definitive evaluation.  2. Pancreatic duct is also dilated, upstream from the pancreatic mass.  3. Status-post cholecystectomy.  4. No definite evidence for intrahepatic or retroperitoneal metastatic disease. Additional nonemergent findings as above.   11/28/2017         Assessment and Plan:   Assessment:   Stephanie Haddad is a 77 year old  Female with a complex abdominal surgical hx and h/o colon cancer who presented to OSH with chronic abdominal pain, nausea, vomiting and jaundice with CT finding of mass at the head of the pancreas and labs consistent with biliary outlet obstruction, admitted for  management of pancreatic mass by GI at [facility].   Patient Active Problem List   Diagnosis     Carpal tunnel syndrome of left wrist     Brachial vein thrombosis (H)     Atherosclerosis     Asthma     Chest pain at rest     Chronic bilateral upper abdominal pain     Closed traumatic displaced fracture of metatarsal bone of right foot with routine healing     Malignant tumor of colon (H)     DDD (degenerative disc disease), lumbar     Depression     Diffuse esophageal spasm     Dilation of esophagus     Epicondylitis     Fibrocystic breast disease     Herniated nucleus pulposus, L5-S1, right     History of colon cancer     History of CVA (cerebrovascular accident)     HTN (hypertension)     Hx SBO     Hypercholesteremia     Hyperlipidemia     Insomnia     Jaundice     MARIBEL (obstructive sleep apnea)     Palpitations     PRES (posterior reversible encephalopathy syndrome)     S/P exploratory laparotomy     S/P gastric bypass     Seizure (H)     Vitamin D deficiency         Plan:   ##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  ##biliary outlet obstruction  ##jaundice  CT done 11/28 showed lesion within the head of the pancreas measuring 12x13.2cm with intrahepatic biliary duct, CBD and pancreatic duct dilatation. Review of CT done 11/3/2017 reports unremarkable pancreas. Therefore question as to whether this mass is malignancy vs inflammatory process. Complete Abd US and MRCP also done at OSH (see results above). Will require further work up of pancreatic mass via ERCP and biopsy to confirm.  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 is causing biliary obstruction leading to elevated bilirubin, LFTs, alk phos and jaundice.      -GI consulted, discussed over phone, plan to perform EUS/ERCP with biopsy in AM. ?stent placement since mass is causing outlet obstruction  -Per GI, IV Vit K 10mg x1 tonight  -AM INR, CMP, CBC, lipase  -CLD for now, NPO at midnight  -No indication per VS, exam and labs for ascending cholangitis at this time or alternative  intra-abdominal infection, therefore antibiotics not indicated. Will start abx if febrile, showing signs of sepsis, or worsening abdominal exam  -hold pharmacologic DVT ppx for now in setting of procedure in AM but quick start following completion     ##Abnormal UA  UA performed at OSH on 11/28 positive for nitrite and bacteria. Did not receive any antibiotics  -repeat UA/UC  -treat for UTI based on results    Chronic Medical Conditions:  ##PRES  ##h/o seizures  -continue keppra 1000mg BID  ##HTN: Continue PTA lisinopril 10mg tab: 2 tabs in AM, 1 in PM  ##H/O CVA: Hold PTA asprin  ##s/p Lula-en-y gastric bypass-continue PTAvitamin and mineral supplements  ##GERD-continue PTA omeprazole  ##Depression-continue PTA sertraline 100mg daily    Daily cares -   F:PO (CLD), IVF once NPO at midnight  E:stable  N:CLD, NPO at midnight  Lines:PIV  Activity:-Up as tolerated  CODE:Full Code  Prophylaxis: mechanical, start pharmacologic DVT ppx following completion of GI procedures tomorrow AM  PCP communication:  - Tamar Salazar  - Contacted at admission: No  - Concerns or updates: none  Dispo: Expected discharge date pending work up of pancreatic mass     Discussed with and examined by faculty.    Stacey Browne MD  Family Medicine PGY2

## 2017-12-01 PROBLEM — K86.89 PANCREATIC MASS: Status: ACTIVE | Noted: 2017-01-01

## 2017-12-01 NOTE — PROGRESS NOTES
CLINICAL NUTRITION SERVICES - ASSESSMENT NOTE     Nutrition Prescription    RECOMMENDATIONS FOR MDs/PROVIDERS TO ORDER:  Diet adv v nutrition support within 2-3 days    Malnutrition Status:    Severe malnutrition in the context of chronic illness    Recommendations already ordered by Registered Dietitian (RD):  Weigh patient    Future/Additional Recommendations:  1. Once/if able to advance diet, recommend offer supplements or order calorie counts    2. [facility] recommends the following vitamin/mineral supplementation s/p  Lula-en-Y gastric bypass:  --Multivitamin/minerals: adult dose 2 times daily  --Calcium Citrate containing vitamin D: 500 mg 3 times daily or 600 mg 2 times daily  --Vitamin B12: sublingual form of at least 500 mcg daily or injection of 1000 mcg monthly     3. If unable to adv or tolerate diet and pt requires FT/TF, please consult Registered Dietitian to Assess and Order TF per Medical Nutrition Therapy protocol.  Would recommend goal Peptamen 1.5 @ goal 50 ml/hr (1200 ml/day) to provide 1800 kcals (30 kcal/kg/day), 82 g PRO (1.4 g/kg/day), 924 ml free H2O, 67 g Fat (70% from MCTs), 226 g CHO and no Fiber daily.     4. If enteral nutrition contraindicated and pt requires nutrition support, would then considering PN with either of  the following:  A) Consider Kabiven (ideally only if on a diet as well as will be limited on how much protein can provide with this PNmix), would start at 55 mL/hr (129 g dextrose, GIR 1.5) if lytes/BGs stable and adv by 15 mL/hr daily if lytes/BGs stable to goal Kabiven @ 70 mL/hr (1680 mL/day) to provide 1436 kcal (24 kcal/kg), 165 g dextrose (560 kcal, GIR 1.9), 55 g AA (0.9 g/kg PRO), 65 g fat (45% kcal from fat)    B) If appropriate for custom, recommend the following:  --Use dosing weight 60 kg  --Begin CPN, goal volume 1440 ml/day (or per provider) with initial 130g Dex daily (442kcal, GIR1.5), 72g AA daily (288 kcal), and 250 ml 20% IV lipids 5 days/week.    --ONLY  "once pt receives ~100% of initial continuous PN volume with K+/Mg++/Phos WNL, advance PN dex by 35 g every 1-2 days (pending lytes/Glu and Pharm D/RD discretion) to initial goal of 200g Dex (680 kcal) to increase provisions to 1325 kcals (22 kcal/kg/day), 1.2 g PRO/kg/day, GIR 2.3 with 27% kcals from Fat.     REASON FOR ASSESSMENT  Stephanie Haddad is a/an 77 year old female assessed by the dietitian for Admission Nutrition Risk Screen for unintentional loss of 10# or more in the past two months    NUTRITION HISTORY  Patient reports very poor appetite since May (7 months) 2/2 early satiety and nausea/vomiting.  Says she can eat 2 teaspoons of a food at a time before she gets full or throws up.  Has tried Ensure but says that doesn't sit well with her.  Is on a regular diet, denies any food allergies.  Does not tolerate meat, but can eat eggs and dairy without issue.  Enjoys fruits and salads and says she tolerates those well.  Patient says he takes a multivitamin daily.  Says in general she has struggled with tolerating food every since her bariatric surgery.    Per chart, PMH includes chronic abdominal pain s/p multiple surgeries including RNYGB (2005 per pt) (however, both \"gastric bypass\" and \"duodenal switch\" documented in chart), colon resection (colon cancer),  an ex lap and ESTEFANIA for SBO on 11/10.  Pt admit yesterday with biliary obstruction and pancreatic head mass.    CURRENT NUTRITION ORDERS  Diet: NPO  Intake/Tolerance: NPO x 1 day since admit    LABS  Labs reviewed  - BUN 5 (L), may indicate low protein intake    MEDICATIONS  Medications reviewed  - Ocuvite w/ lutein  - Folgard (folic acid/vitamin B6, vitamin B12)  - Oscal w/ D    ANTHROPOMETRICS  Height: 160 cm (5' 3\")  Most Recent Weight: reported 155# (70.5 kg)  IBW: 56.4 kg   BMI: Overweight BMI 25-29.9  Weight History: Pt reports she has lost ~30# since May, prior to May weight was stable ~185#. No weights recorded yet since admit, but assuming pt's " reported lowest recent weight is 155# this would indicate a 16% wt loss over the past 7 months  Wt Readings from Last 10 Encounters:   10/30/17 74.8 kg (165 lb)   06/22/11 77.5 kg (170 lb 12.8 oz)   05/21/11 82.9 kg (182 lb 11.2 oz)   04/27/11 80.9 kg (178 lb 4.8 oz)      Dosing Weight: 60 kg (adjusted based on reported PTA lowest recent wt 70.5 kg and IBW)    ASSESSED NUTRITION NEEDS  Estimated Energy Needs: 6826-9460+ kcals/day (25 - 30+ kcals/kg for PO/EN); 9922-5137 kcals/day (20 - 25 kcals/kg for PN)  Justification: Maintenance, aim higher end with EN/PO given wt loss trends; aim lower range with PN to prevent overfeeding  Estimated Protein Needs: 72-90+ grams protein/day (1.2 - 1.5+ grams of pro/kg)  Justification: Hypercatabolism with acute illness and Repletion  Estimated Fluid Needs: (1 mL/kcal)   Justification: Maintenance    PHYSICAL FINDINGS  See malnutrition section below.    MALNUTRITION  % Intake: </= 50% for >/= 5 days (severe)  % Weight Loss: > 10% in 6 months (severe)  Subcutaneous Fat Loss: Upper arm:  Moderate (a lot of excess skin, assuming related to hx bariatric surgery)  Muscle Loss: Upper arm (bicep, tricep):  Moderate (pt reports generalized muscle loss all over with recent weight loss  Fluid Accumulation/Edema: None noted per provider note today  Malnutrition Diagnosis: Severe malnutrition in the context of chronic illness    NUTRITION DIAGNOSIS  Inadequate oral intake related to nausea, vomiting, and early satiety hindering PO intakes as evidenced by pt reports poor PO and unintentional 16% wt loss over the past 7 months     INTERVENTIONS  Implementation  Nutrition Education: Provided education on role of RD and nutrition POC   Ordered weight    Goals  Diet adv v nutrition support within 2-3 days.     Monitoring/Evaluation  Progress toward goals will be monitored and evaluated per protocol.     Terri Mauricio RD, LD  Pager: [phone]

## 2017-12-01 NOTE — PROGRESS NOTES
[facility] - Inpatient daily progress note    Date of admission: 11/30/2017  Date of service: 12/1/2017.         Assessment and Plan:   Assessment:   Stephanie Haddad is a 77 year old  female with a significant past medical history of multiple abdominal surgeries (see below), h/o colon cancer, h/o SBO, HTN, GERD, and HLD, who presents from OSH for further evaluation and management of biliary obstruction 2/2 mass in head of pancreas seen on CT.  Patient Active Problem List   Diagnosis     Carpal tunnel syndrome of left wrist     Brachial vein thrombosis (H)     Atherosclerosis     Asthma     Chest pain at rest     Chronic bilateral upper abdominal pain     Closed traumatic displaced fracture of metatarsal bone of right foot with routine healing     Malignant tumor of colon (H)     DDD (degenerative disc disease), lumbar     Depression     Diffuse esophageal spasm     Dilation of esophagus     Epicondylitis     Fibrocystic breast disease     Herniated nucleus pulposus, L5-S1, right     History of colon cancer     History of CVA (cerebrovascular accident)     HTN (hypertension)     Hx SBO     Hypercholesteremia     Hyperlipidemia     Insomnia     Jaundice     MARIBEL (obstructive sleep apnea)     Palpitations     PRES (posterior reversible encephalopathy syndrome)     S/P exploratory laparotomy     S/P gastric bypass     Seizure (H)     Vitamin D deficiency     Pancreatic mass      Plan:   ##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  ##biliary outlet obstruction: Elevated LFTS, AlkP, Bilirubin  ##jaundice  CT done 11/28 showed lesion within the head of the pancreas measuring 1.2 x 1.3 cm with intrahepatic biliary duct, CBD and pancreatic duct dilatation. Review of CT done 11/3/2017 reports unremarkable pancreas. Complete Abd US and MRCP also done at OSH.  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 is causing biliary obstruction leading to elevated bilirubin, LFTs, alk phos and jaundice.    -GI pac/bili consulted, discussing  plan, currently keeping NPO, appreciate recommendations  -Bariatric surgery consulted regarding prior bypass surgery and approach for new pancreatic mass  -Per GI, IV Vit K 10mg, given overnight  - Repeat labs this AM: AlkP >2330, ALT//310, Bili 8.8 (7.4 direct), lipase 31  -No indication per VS, exam and labs for ascending cholangitis at this time or alternative intra-abdominal infection. Will start abx if febrile, showing signs of sepsis, or worsening abdominal exam  -hold pharmacologic DVT ppx for now in setting of procedure in AM but quick start following completion      ##Abnormal UA  UA performed at OSH on 11/28 positive for nitrite and bacteria. Did not receive any antibiotics  - Denies symptoms, although could be masked by her symptoms from pancreatic mass  - UCx 11/28/17 with >100k colonies pan-sensitive E. Coli  - Bactrim oral DS BID for 3 days (pending surgery may stop/change)     Chronic Medical Conditions:  ##PRES  ##h/o seizures  -continue keppra 1000mg BID  ##HTN: Continue PTA lisinopril 10mg tab: 2 tabs in AM, 1 in PM  ##H/O CVA: Hold PTA asprin  ##s/p Lula-en-y gastric bypass-continue PTAvitamin and mineral supplements  ##GERD-continue PTA omeprazole  ##Depression-continue PTA sertraline 100mg daily     Daily cares -   F:IVF@ 100 ml/hr while NPO  E:stable  N:NPO  Lines:PIV  Activity:-Up as tolerated  CODE:Full Code  Prophylaxis: mechanical, may start pharmacologic DVT ppx post-procedure  PCP communication:  - Tamar Salazar  - Contacted at admission: No  - Concerns or updates: none  Dispo: Expected discharge date pending work up of pancreatic mass             Interval History:   Stephanie Haddad this morning reported increasing generalized pruritis, no worsening abdominal pain or nausea, has been NPO since midnight, was able to tolerate small sips last evening.  She has remained afebrile, no chills or diaphoresis.  Has baseline urinary overflow incontinence, reports maybe slight increase in  accidents but no dysuria or urgency, unclear if UTI symptoms with current underlying low po intake, abdominal pain.            Review of Systems:   Review of Systems   Constitutional: Positive for appetite change and fatigue. Negative for chills, diaphoresis and fever.   HENT: Negative for trouble swallowing.    Eyes: Negative for visual disturbance.   Respiratory: Negative for cough, shortness of breath and wheezing.    Cardiovascular: Negative for chest pain and leg swelling.   Gastrointestinal: Positive for abdominal pain, nausea and vomiting. Negative for diarrhea.   Endocrine: Negative for polydipsia.   Genitourinary: Negative for dysuria, flank pain and urgency.   Musculoskeletal: Negative for joint swelling and neck stiffness.   Skin: Negative for rash.   Neurological: Negative for dizziness and headaches.   Psychiatric/Behavioral: Negative for agitation and confusion.            Physical Exam (Resident / Clinician):   Vitals were reviewed  Patient Vitals for the past 8 hrs:   BP Temp Temp src Heart Rate Resp SpO2 Height Weight   12/01/17 1400 - - - (P) 91 - (P) 99 % - -   12/01/17 1315 - - - 80 16 95 % - -   12/01/17 1300 150/58 98.3  F (36.8  C) Oral - 16 95 % 1.524 m (5') 74.8 kg (165 lb)       Physical Exam   Constitutional: She is oriented to person, place, and time. She appears well-nourished. No distress.   HENT:   Head: Normocephalic and atraumatic.   Mouth/Throat: No oropharyngeal exudate.   Eyes: EOM are normal. Right eye exhibits no discharge. Left eye exhibits no discharge. Scleral icterus is present.   Neck: Normal range of motion.   Cardiovascular: Normal rate and regular rhythm.    Murmur heard.  Pulmonary/Chest: Effort normal. No respiratory distress. She has no wheezes.   Abdominal: Soft. She exhibits no distension. Tenderness: minimal to no tenderness all quadrants.   Multiple well-healed surgical scars   Neurological: She is alert and oriented to person, place, and time.   Skin: Skin is  warm. No rash noted. She is not diaphoretic.   Significantly jaundiced   Psychiatric: She has a normal mood and affect. Her behavior is normal.       I/O last 3 completed shifts:  In: 1375 [P.O.:50; I.V.:1325]  Out: -         Data:   ROUTINE LABS (Last four results)  CMP    Recent Labs  Lab 12/01/17  0705      POTASSIUM 4.0   CHLORIDE 109   CO2 24   ANIONGAP 8   *   BUN 5*   CR 0.74   GFRESTIMATED 75   GFRESTBLACK >90   SERENA 8.6   PROTTOTAL 4.6*   ALBUMIN 1.6*   BILITOTAL 8.8*   ALKPHOS >2330*   *   *     CBC    Recent Labs  Lab 12/01/17  0705   WBC 3.9*   RBC 3.54*   HGB 10.7*   HCT 35.4      MCH 30.2   MCHC 30.2*   RDW 18.2*        INR    Recent Labs  Lab 12/01/17  0705   INR 1.09     CRPNo lab results found in last 7 days.      Blood culture:  Invalid input(s): BC   Urine culture:  No results for input(s): URC in the last 168 hours.  All cultures:  No results for input(s): CULT in the last 168 hours.        Medications:     Current Facility-Administered Medications   Medication     [Auto Hold] sulfamethoxazole-trimethoprim (BACTRIM DS/SEPTRA DS) 800-160 MG per tablet 1 tablet     simethicone 133mg/2mL oral suspension     iopamidol (ISOVUE-250) solution     [Auto Hold] HYDROmorphone (PF) (DILAUDID) injection 0.5 mg     [Auto Hold] naloxone (NARCAN) injection 0.1-0.4 mg     [Auto Hold] Calcium carb-Vitamin D 500 mg [place]-200 units (OSCAL with D;Oyster Shell Calcium) per tablet 1 tablet     [Auto Hold] fexofenadine (ALLEGRA) tablet 180 mg     [Auto Hold] folic acid-vit B6-vit B12 (FOLGARD) per tablet 1 tablet     [Auto Hold] lovastatin (MEVACOR) tablet 40 mg     [Auto Hold] montelukast (SINGULAIR) tablet 10 mg     [Auto Hold] omeprazole (priLOSEC) CR capsule 20 mg     [Auto Hold] senna-docusate (SENOKOT-S;PERICOLACE) 8.6-50 MG per tablet 1 tablet     [Auto Hold] sertraline (ZOLOFT) tablet 100 mg     [Auto Hold] naloxone (NARCAN) injection 0.1-0.4 mg     [Auto Hold] lidocaine 1  % 1 mL     [Auto Hold] lidocaine (LMX4) kit     [Auto Hold] sodium chloride (PF) 0.9% PF flush 3 mL     [Auto Hold] sodium chloride (PF) 0.9% PF flush 3 mL     [Auto Hold] acetaminophen (TYLENOL) tablet 650 mg     [Auto Hold] senna-docusate (SENOKOT-S;PERICOLACE) 8.6-50 MG per tablet 1 tablet    Or     [Auto Hold] senna-docusate (SENOKOT-S;PERICOLACE) 8.6-50 MG per tablet 2 tablet     [Auto Hold] ondansetron (ZOFRAN-ODT) ODT tab 4 mg    Or     [Auto Hold] ondansetron (ZOFRAN) injection 4 mg     [Auto Hold] prochlorperazine (COMPAZINE) injection 5 mg    Or     [Auto Hold] prochlorperazine (COMPAZINE) tablet 5 mg    Or     [Auto Hold] prochlorperazine (COMPAZINE) Suppository 12.5 mg     [Auto Hold] multivitamin  with lutein (OCUVITE WITH LTEIN) per capsule 1 capsule     temazepam (RESTORIL) capsule 15 mg     [Auto Hold] levETIRAcetam (KEPPRA) tablet 1,000 mg     [Auto Hold] lisinopril (PRINIVIL/ZESTRIL) tablet 10 mg     [Auto Hold] lisinopril (PRINIVIL/ZESTRIL) tablet 20 mg     0.9% sodium chloride infusion     Facility-Administered Medications Ordered in Other Encounters   Medication     fentaNYL (PF) (SUBLIMAZE) injection     propofol (DIPRIVAN) injection 10 mg/mL vial     rocuronium (ZEMURON) injection     lactated ringers infusion     levofloxacin (LEVAQUIN) infusion     esmolol (BREVIBLOC) injection     ePHEDrine injection     phenylephrine (CHINEDU-SYNEPHRINE) injection 1 mg     ondansetron (ZOFRAN) injection     sugammadex (BRIDION) injection       Caring Physician: Von Brooke MD  [facility], [place]  Pager Contact: see Physician sticky note

## 2017-12-01 NOTE — CONSULTS
Minimally Invasive Surgery Consultation Note    CC: biliary obstruction and pancreatic head mass    HPI: Stephanie Haddad is a 78yo F who was transferred from OSH with biliary obstruction and pancreatic head mass. She has a history of RYGB in 2004. She has had multiple ex laps and bowel resections, most recently ex lap with repair of mesenteric defect and ESTEFANIA for SBO on 11/10/2017 at OSH. She also has new diagnosis of PRES and was started on keppra.   She reports having difficulty with abdominal pain and PO intolerance ever since her gastric bypass in 2004. She takes oxycodone for her pain. The pain and vomiting has gotten worse over the past six months. She has had extensive workup including multiple EGDs all of which were negative.   She presented to OSH on 11/28 and workup there showed a mass in the pancreatic head with biliary obstruction. Given her complicated PSH and altered anatomy, she was transferred here for further treatment.     Past Medical History:  Past Medical History:   Diagnosis Date     Angina      Asthma, chronic      Atherosclerosis      Colon cancer (H)      Depression      Diverticular disease of colon      HTN (hypertension)      Hyperlipidemia      Insomnia      Obesity      MARIBEL (obstructive sleep apnea)      PONV (postoperative nausea and vomiting)      Right ovarian cyst      Patient Active Problem List   Diagnosis     Carpal tunnel syndrome of left wrist     Brachial vein thrombosis (H)     Atherosclerosis     Asthma     Chest pain at rest     Chronic bilateral upper abdominal pain     Closed traumatic displaced fracture of metatarsal bone of right foot with routine healing     Malignant tumor of colon (H)     DDD (degenerative disc disease), lumbar     Depression     Diffuse esophageal spasm     Dilation of esophagus     Epicondylitis     Fibrocystic breast disease     Herniated nucleus pulposus, L5-S1, right     History of colon cancer     History of CVA (cerebrovascular accident)      HTN (hypertension)     Hx SBO     Hypercholesteremia     Hyperlipidemia     Insomnia     Jaundice     MARIBEL (obstructive sleep apnea)     Palpitations     PRES (posterior reversible encephalopathy syndrome)     S/P exploratory laparotomy     S/P gastric bypass     Seizure (H)     Vitamin D deficiency     Pancreatic mass       Surgical History:  Past Surgical History:   Procedure Laterality Date     APPENDECTOMY OPEN       ARTHROSCOPY KNEE BILATERAL       ARTHROSCOPY SHOULDER ROTATOR CUFF REPAIR       BYPASS GASTRIC DUODENAL SWITCH  2004    Lula-en-y     CHOLECYSTECTOMY       COLECTOMY WITHOUT COLOSTOMY       LAPAROSCOPIC HYSTERECTOMY TOTAL, BILATERAL SALPINGO-OOPHORECTOMY, NODE DISSECTION, COMBINED  5/20/2011    Procedure: Open total abdominal hysterectomy and right salpingo-oophorectomy, lysis of adhesions; Surgeon:GERSON SILVA; Location:[facility] OR     LAPAROTOMY EXPLORATORY  11/10/2017    with lysis of adhesions     LAPAROTOMY EXPLORATORY  10/2015    with repair of bile duct leak     PHACOEMULSIFICATION CLEAR CORNEA WITH STANDARD INTRAOCULAR LENS IMPLANT      bilateral     TONSILLECTOMY       UPPER GI ENDOSCOPY  9/2015, 7/2017, 11/2017     uvuloplasty       Medications:  Prior to Admission medications    Medication Sig Start Date End Date Taking? Authorizing Provider   omeprazole (PRILOSEC) 20 MG CR capsule Take 20 mg by mouth   Yes Reported, Patient   ibuprofen (ADVIL,MOTRIN) 600 MG tablet Take 1 tablet by mouth every 6 hours as needed (inflammatory pain). 5/21/11  Yes Venice Saleh   senna-docusate (SENOKOT-S;PERICOLACE) 8.6-50 MG per tablet Take 1 tablet by mouth daily. 5/21/11  Yes Venice Saleh   aspirin 325 MG tablet Take 325 mg by mouth daily.   Yes Reported, Patient   CALCIUM-VITAMIN D PO Take 2 daily   Yes Reported, Patient   lisinopril (PRINIVIL,ZESTRIL) 10 MG tablet Take 10 mg by mouth 2 times daily    Yes Reported, Patient   lovastatin (MEVACOR) 40 MG tablet Take 40 mg by mouth At Bedtime.   Yes  Reported, Patient   montelukast (SINGULAIR) 10 MG tablet Take 10 mg by mouth At Bedtime.   Yes Reported, Patient   Folic Acid-Vit B6-Vit B12 (FOLBEE PO) Take 1 daily   Yes Reported, Patient   sertraline (ZOLOFT) 100 MG tablet Take 100 mg by mouth daily.   Yes Reported, Patient   temazepam (RESTORIL) 15 MG capsule Take 15 mg by mouth nightly as needed.   Yes Reported, Patient   multivitamin (OCUVITE) TABS Take 1 tablet by mouth daily    Yes Reported, Patient   IRON PO Time release take ONE daily   Yes Reported, Patient   oxycodone-acetaminophen (PERCOCET) 5-325 MG per tablet Take 1-2 tablets by mouth every 6 hours as needed for pain. 5/22/11   Deandra Boyd MD   Glucagon rDNA, Diagnostic, (GLUCAGON DIAGNOSTIC IJ) Inject 1 mg as directed. 0.25 - 0.5 mg IV during MRI research scan, may repeat once for a max of 1mg IV. 5/17/11   Tristian Nash MD   fexofenadine (ALLEGRA) 180 MG tablet Take 180 mg by mouth daily.    Reported, Patient   NIACIN CR PO Take  by mouth.    Reported, Patient   nitroGLYCERIN (NITROQUICK) 0.4 MG SL tablet Place 0.4 mg under the tongue every 5 minutes as needed.    Reported, Patient   UNABLE TO FIND Ascorbplex 1000 mg 2 times daily    Reported, Patient     No current outpatient prescriptions on file.     Allergies:  Allergies   Allergen Reactions     Valium      Vicodin [Hydrocodone-Acetaminophen] Other (See Comments)     hallucination     Codeine Sulfate Hives     Demerol Hives     Morphine Hcl Itching     Ibuprofen-Oxycodone      Bad abdominal pain and black bowel     Social History:  Social History     Social History     Marital status: Single     Spouse name: N/A     Number of children: N/A     Years of education: N/A     Social History Main Topics     Smoking status: Never Smoker     Smokeless tobacco: Never Used     Alcohol use No     Drug use: No     Sexual activity: Not on file     Other Topics Concern     Not on file     Social History Narrative     Family History:  Family History    Problem Relation Age of Onset     Breast Cancer Mother      Breast Cancer Maternal Aunt      Pancreatic Cancer Maternal Uncle      Review of Systems:  A 14 point review of systems was reviewed in our paper questionnaire.     Physical Examination:  Vital Signs: /52 (BP Location: Right arm)  Pulse 78  Temp 98.3  F (36.8  C) (Oral)  Resp 16  SpO2 94%  NAD. Jaundiced.  Lungs: Breathing unlabored  Abdomen: soft/nontender/nondistended. Midline laparotomy scar well healed.     Imaging:  CTAP at OSH 11/28/2017  1. Interval development of dilated common bile duct and pancreatic duct with possible mass or inflammatory process within the head of the pancreas. ERCP or MRCP are recommended for further evaluation.   2. Fatty infiltration of the liver.     Abd US at OSH 11/28/2017  Findings concerning for a 2.3 x 1.5 x 2.2 cm hypoechoic mass at the head of the pancreas with marked intrahepatic and extrahepatic bile duct dilatation. No definite bile duct stones are noted.     MRCP at OSH 11/28/2017  1. Intrahepatic and extrahepatic biliary ductal dilatation, consistent with the appearance of the CT scan. This is probably caused by pancreatic mass, not assessed on this study. The mass can be seen on the recent CT scan. Endoscopic ultrasound and biopsy recommended for definitive evaluation.  2. Pancreatic duct is also dilated, upstream from the pancreatic mass.  3. Status-post cholecystectomy.  4. No definite evidence for intrahepatic or retroperitoneal metastatic disease. Additional nonemergent findings as above.    Laboratory testing:  Lab Results   Component Value Date    WBC 3.9 12/01/2017     Lab Results   Component Value Date    RBC 3.54 12/01/2017     Lab Results   Component Value Date    HGB 10.7 12/01/2017     Lab Results   Component Value Date    HCT 35.4 12/01/2017     No components found for: MCT  Lab Results   Component Value Date     12/01/2017     Lab Results   Component Value Date    MCH 30.2  12/01/2017     Lab Results   Component Value Date    MCHC 30.2 12/01/2017     Lab Results   Component Value Date    RDW 18.2 12/01/2017     Lab Results   Component Value Date     12/01/2017     Last Basic Metabolic Panel:  Lab Results   Component Value Date     12/01/2017      Lab Results   Component Value Date    POTASSIUM 4.0 12/01/2017     Lab Results   Component Value Date    CHLORIDE 109 12/01/2017     Lab Results   Component Value Date    SERENA 8.6 12/01/2017     Lab Results   Component Value Date    CO2 24 12/01/2017     Lab Results   Component Value Date    BUN 5 12/01/2017     Lab Results   Component Value Date    CR 0.74 12/01/2017     Lab Results   Component Value Date     12/01/2017     INR/Prothrombin Time - 1.09    Liver Function Studies -   Recent Labs   Lab Test  12/01/17   0705   PROTTOTAL  4.6*   ALBUMIN  1.6*   BILITOTAL  8.8*   ALKPHOS  >2330*   AST  310*   ALT  178*     Lipase - 31    Assessment:  76 yo F with PMH RYGB 2004 now with pancreatic mass and biliary obstruction. We anticipate that she has a hostile abdomen due to recent ex lap.     Plan:  - agree with GI plan of GG and placement of Axios stent as she will need repeat endoscopic procedures  - recommend consulting surg onc for new panc mass  - will sign off. Please call with new questions.     Kelly Mitchell MD  General Surgery PGY-1  [phone]

## 2017-12-01 NOTE — OR NURSING
Dr. Garner called regarding needing an EKG, he verbalized he will look at pt's chart and order if needed.

## 2017-12-01 NOTE — PROGRESS NOTES
Pt admitted to  at 1600 from OSH for evaluation and treatment of abd pain and plan for ERCP in the setting of pancreatic mass. Plan for ERCP tomorrow, NPO at 0000. GI consult placed. GI requesting 10mg IV Vit K, INR 1.0, will recheck in am. Pt is A&O, VSS on RA. Abd pain controlled with prn dilaudid q3 hrs. T-Pump also in use for adjunct pain relief. PIV in L AC SL. Pt is SBA with walker, has hx of 2 falls in last 6 months. LBM 11/28, though pt states she has eaten very little in the last 3 weeks and does not feel constipated. Good OU. UA needed. Sips w/ meds.

## 2017-12-01 NOTE — PLAN OF CARE
Problem: Patient Care Overview  Goal: Plan of Care/Patient Progress Review  Outcome: No Change  Pt A/Ox4, VSS and C/O tightness in abd. PRN IV dilaudid given x1, relief. Up with SBA to toilet, no BM overnight. L PIV infusing NS at 100/hr. NPO with ice chips. Pt received vit K yesterday, INR to be drawn in AM. Plan is for pt to have ERCP today per report.

## 2017-12-01 NOTE — CONSULTS
GASTROENTEROLOGY CONSULTATION      Date of Admission:  11/30/2017  Reason for Admission: Biliary obstruction  Date of Consult  12/1/2017   Requesting Physician:  John Mae MD           ASSESSMENT AND RECOMMENDATIONS:   Assessment:  77 year old female with a history of chronic abdominal pain s/p multiple surgeries including RNYGB, colon resection (colon cancer), open appy, CCY and most recently an ex lap and ESTEFANIA for SBO on 11/10. She presented to OSH on 11/28 with jaundice and worsening abdominal pain, CT scan A/P showing new pancreatic head mass. MRCP with biliary and pancreatic duct dilation. Patient transferred for EUS/ERCP with biopsies. Bypass surgery making obtaining these difficult - options include lap access with [facility]IS colleagues but may need access in the future for biliary stenting and would either require repeat laparotomy or leaving her with PEG tube. Another option that would be reversing the RNYGB by creating a gastrogastrostomy (Axios from pouch to remnant stomach) and proceeding with EUS and ERCP through that route. Discussed with bariatric surgery team. Importantly she does not appear septic or cholangitic, Vitals stable. Abdominal pain controlled.     Recommendations:  Plan for EGD/EUS later this afternoon or possibly tomorrow (timing dependant on OR availability)  Continue NPO status   Trend LFT  Analgesia/Antiemetics per primary team  Discussed with primary and bariatric teams    Gastroenterology follow up recommendations: TBD    Thank you for involving us in this patient's care. Please do not hesitate to contact the GI service with any questions or concerns.     Pt seen and care plan discussed with Dr. Vargas, GI staff physician.    Ginny Goddard PA-C  [facility]  [facility]  Pager *[phone]  -------------------------------------------------------------------------------------------------------------------       Reason for  "Consultation:   \"Biliary obstruction\"           History of Present Illness:   Patient seen and examined at 1035. History is obtained from the patient.    Stephanie Haddad is a 77 year old female with a PMH significantly for obesity s/p RNYGB in 2004, colon cancer s/p colon resection, h/o SBO with recent ex-lap and ESTEFANIA on 11/10 who is transferred from OSH for new pancreatic head mass seen on CT scan A/P. The patient tells me she has been dealing with abdominal pain, nausea, vomiting and weight loss for a few months. She has had multiple EGDs performed which were unremarkable. She was recently hospitalized 11/7-11/14 for acute on chronic abdominal pain with elevated CRP, was seen by gen surg due to concern for SBO and underwent ex lap with repair of a mesenteric defect and lysis of adhesions on 11/10. She reports that her pain is diffuse and throughout her abd. Pain is worse with eating. Has been taking narcotics at home to help with pain. Has had frequent post-prandial vomiting, reports weight loss ~30lb in the last few months. She noticed jaundice and worsening pain 11/28 so presented to the ED where she was found to have elevated LFT (Tbili 8.5, alk phos 2228,  and ). CT scan A/P obtained showing dilated CBD and PD with possible mass or inflammatory process within pancreatic head. MRCP obtained again showing intra/extrahepatic biliary dilation and PD dilation upstream from mass. Patient transferred to [facility] for further eval and management.            Past Medical History:   Reviewed and edited as appropriate  Past Medical History:   Diagnosis Date     Angina      Asthma, chronic      Atherosclerosis      Colon cancer (H)      Depression      Diverticular disease of colon      HTN (hypertension)      Hyperlipidemia      Insomnia      Obesity      MARIBEL (obstructive sleep apnea)      PONV (postoperative nausea and vomiting)      Right ovarian cyst             Past Surgical History:   Reviewed and edited " as appropriate   Past Surgical History:   Procedure Laterality Date     APPENDECTOMY OPEN       ARTHROSCOPY KNEE BILATERAL       ARTHROSCOPY SHOULDER ROTATOR CUFF REPAIR       BYPASS GASTRIC DUODENAL SWITCH  2004    Lula-en-y     CHOLECYSTECTOMY       COLECTOMY WITHOUT COLOSTOMY       LAPAROSCOPIC HYSTERECTOMY TOTAL, BILATERAL SALPINGO-OOPHORECTOMY, NODE DISSECTION, COMBINED  5/20/2011    Procedure: Open total abdominal hysterectomy and right salpingo-oophorectomy, lysis of adhesions; Surgeon:GERSON SILVA; Location:[facility] OR     LAPAROTOMY EXPLORATORY  11/10/2017    with lysis of adhesions     LAPAROTOMY EXPLORATORY  10/2015    with repair of bile duct leak     PHACOEMULSIFICATION CLEAR CORNEA WITH STANDARD INTRAOCULAR LENS IMPLANT      bilateral     TONSILLECTOMY       UPPER GI ENDOSCOPY  9/2015, 7/2017, 11/2017     uvuloplasty                Social History:     Social History     Social History     Marital status: Single     Spouse name: N/A     Number of children: N/A     Years of education: N/A     Occupational History     Not on file.     Social History Main Topics     Smoking status: Never Smoker     Smokeless tobacco: Never Used     Alcohol use No     Drug use: No     Sexual activity: Not on file     Other Topics Concern     Not on file     Social History Narrative                Family History:   Reviewed and edited as appropriate  Family History   Problem Relation Age of Onset     Breast Cancer Mother      Breast Cancer Maternal Aunt      Pancreatic Cancer Maternal Uncle              Allergies:   Reviewed and edited as appropriate     Allergies   Allergen Reactions     Valium      Vicodin [Hydrocodone-Acetaminophen] Other (See Comments)     hallucination     Codeine Sulfate Hives     Demerol Hives     Morphine Hcl Itching     Ibuprofen-Oxycodone      Bad abdominal pain and black bowel            Medications:     Current Facility-Administered Medications   Medication     sulfamethoxazole-trimethoprim (BACTRIM  DS/SEPTRA DS) 800-160 MG per tablet 1 tablet     HYDROmorphone (PF) (DILAUDID) injection 0.5 mg     naloxone (NARCAN) injection 0.1-0.4 mg     Calcium carb-Vitamin D 500 mg Pokagon-200 units (OSCAL with D;Oyster Shell Calcium) per tablet 1 tablet     fexofenadine (ALLEGRA) tablet 180 mg     folic acid-vit B6-vit B12 (FOLGARD) per tablet 1 tablet     lovastatin (MEVACOR) tablet 40 mg     montelukast (SINGULAIR) tablet 10 mg     omeprazole (priLOSEC) CR capsule 20 mg     senna-docusate (SENOKOT-S;PERICOLACE) 8.6-50 MG per tablet 1 tablet     sertraline (ZOLOFT) tablet 100 mg     naloxone (NARCAN) injection 0.1-0.4 mg     lidocaine 1 % 1 mL     lidocaine (LMX4) kit     sodium chloride (PF) 0.9% PF flush 3 mL     sodium chloride (PF) 0.9% PF flush 3 mL     acetaminophen (TYLENOL) tablet 650 mg     senna-docusate (SENOKOT-S;PERICOLACE) 8.6-50 MG per tablet 1 tablet    Or     senna-docusate (SENOKOT-S;PERICOLACE) 8.6-50 MG per tablet 2 tablet     ondansetron (ZOFRAN-ODT) ODT tab 4 mg    Or     ondansetron (ZOFRAN) injection 4 mg     prochlorperazine (COMPAZINE) injection 5 mg    Or     prochlorperazine (COMPAZINE) tablet 5 mg    Or     prochlorperazine (COMPAZINE) Suppository 12.5 mg     multivitamin  with lutein (OCUVITE WITH LTEIN) per capsule 1 capsule     temazepam (RESTORIL) capsule 15 mg     levETIRAcetam (KEPPRA) tablet 1,000 mg     lisinopril (PRINIVIL/ZESTRIL) tablet 10 mg     lisinopril (PRINIVIL/ZESTRIL) tablet 20 mg     0.9% sodium chloride infusion             Review of Systems:   A complete review of systems was performed and is negative except as noted in the HPI      Skin: positive for jaundice  Eyes: positive for scleral icteris  Ears/Nose/Throat: negative  Respiratory: No shortness of breath, dyspnea on exertion, cough, or hemoptysis  Cardiovascular: negative  Gastrointestinal: positive for nausea, vomiting and abdominal pain  Genitourinary: negative  Musculoskeletal: negative  Neurologic:  negative  Psychiatric: negative  Hematologic/Lymphatic/Immunologic: positive for weight loss. Denies fever.  Endocrine: negative         Physical Exam:   Temp: 98.3  F (36.8  C) Temp src: Oral BP: 129/52 Pulse: 78   Resp: 16 SpO2: 94 % O2 Device: None (Room air)    Wt:   Wt Readings from Last 2 Encounters:   10/30/17 74.8 kg (165 lb)   06/22/11 77.5 kg (170 lb 12.8 oz)        General: Elderly frail female in NAD.  Answers appropriately.    HEENT: Head is AT/NC. Sclera +icteric. No conjunctival injection.  Oropharynx is clear, moist and w/o exudate or lesions.  Neck: No masses or thyromegaly.  Lungs: Clear to auscultation bilaterally.  No wheezes, rhonchi or crackles.    Heart: Regular rate and rhythm.  No murmurs, gallops or rubs.  Normal S1 and S2.  Abdomen: Soft, mild tenderness diffusely, non-distended.  BS +.  No hepatosplenomegaly. No rebound or peritoneal signs  Extremities: No pedal edema.  Heme/Lymph: No cervical or supraclavicular adenopathy.   Skin: No jaundice, rash  Neurologic: Grossly non-focal.  CN 2-12 grossly intact.           Data:   Labs and imaging below were independently reviewed and interpreted    LAB WORK:    BMP  Recent Labs  Lab 12/01/17  0705      POTASSIUM 4.0   CHLORIDE 109   SERENA 8.6   CO2 24   BUN 5*   CR 0.74   *     CBC  Recent Labs  Lab 12/01/17  0705   WBC 3.9*   RBC 3.54*   HGB 10.7*   HCT 35.4      MCH 30.2   MCHC 30.2*   RDW 18.2*        INR  Recent Labs  Lab 12/01/17  0705   INR 1.09     LFTs  Recent Labs  Lab 12/01/17  0705   ALKPHOS >2330*   *   *   BILITOTAL 8.8*   PROTTOTAL 4.6*   ALBUMIN 1.6*      PANC  Recent Labs  Lab 12/01/17  0705   LIPASE 31*       IMAGING:  MRCP 11/28/2017    CLINICAL DATA:   Abdominal pain and jaundice, biliary dilatation on recent CT scan.    COMPARISON:   None. CT scan of 11/28/2017 is available.    TECHNIQUE:   Axial and coronal T2-weighted images are obtained. High-resolution heavily   T2-weighted images  are obtained through the biliary system and 3-D MIPS   performed. No contrast given.    FINDINGS:   The biliary system is dilated, with a tortuous common bile duct. Common   bile duct measures 17 mm in size. There is an abrupt termination of the   common bile duct at the pancreatic head at the site of the mass seen on CT   scan. The mass is not clearly seen on MRI. However, this study is not   designed for pancreatic evaluation. The pancreatic duct is also dilated,   upstream from the pancreatic mass. No filling defects are seen within the   biliary system or pancreatic duct. No definite choledocholithiasis.    The patient is status-post cholecystectomy. No definite focal hepatic   lesions are noted. The spleen is normal in size. No evidence for adrenal   mass. Extrarenal pelves are seen with peripelvic cysts. No definite   obstructive uropathy. Abdominal aorta is normal in size. No   retroperitoneal adenopathy. Loops of bowel appear within normal limits.   Postoperative scar is seen in the anterior abdominal wall. Degenerative   disc disease is seen throughout the lower thoracic and lumbar spine.  1. Intrahepatic and extrahepatic biliary ductal dilatation, consistent   with the appearance of the CT scan. This is probably caused by pancreatic   mass, not assessed on this study. The mass can be seen on the recent CT   scan. Endoscopic ultrasound and biopsy recommended for definitive   evaluation.  2. Pancreatic duct is also dilated, upstream from the pancreatic mass.  3. Status-post cholecystectomy.  4. No definite evidence for intrahepatic or retroperitoneal metastatic   disease. Additional nonemergent findings as above.    Results discussed with Dr. Duke on 11/28/2017 at 1710.    D: 11/28/2017 17:59   Dictated by:  Tony Huerta MD    T: 11/28/2017 18:54  j     CT SCAN ABDOMEN AND PELVIS WITH CONTRAST    CLINICAL DATA:   Abdominal pain. Recent small bowel obstruction status post laparoscopy 2-3    weeks ago. Now with new jaundice and diffuse abdominal pain.    COMPARISON:   11/03/2017    TECHNIQUE:   While obtaining CT images, dose reduction techniques were utilized   including:    Automated exposure control    Adjustment of mA and/or kV according to patient size, and/or    Use of iterative reconstruction technique    FINDINGS:   Lung bases are clear other than dependent atelectasis. There is no pleural   or pericardial fluid.     ABDOMEN/PELVIS: There is diffuse decreased attenuation of the liver   consistent with fatty infiltration. There has been interval development of   markedly dilated common bile duct measuring up to 1.4 cm in diameter.   There is a mildly dilated pancreatic duct which is increased in size since   the previous exam. There is a slightly hypoattenuating lesion within the   head of the pancreas which is more pronounced than on the previous exam   and measures approximately 13 x 13.2 cm in maximal transverse dimension on   S2:51. This may reflect focal pancreatitis; however, underlying lesion   within the pancreas is not excluded. This is not appreciated on the   previous exam however. There is intrahepatic biliary ductal dilatation   also noted.    Bowel loops are normal in caliber. There is no free air. There is a mild   amount of free fluid within the cul-de-sac. Perivesical and perirectal fat   planes are preserved. Uterus is absent.  1. Interval development of dilated common bile duct and pancreatic duct   with possible mass or inflammatory process within the head of the   pancreas. ERCP or MRCP are recommended for further evaluation.   2. Fatty infiltration of the liver.     Results called to Dr. Duke in the emergency room at 13:16 hours.    D: 11/28/2017 13:15   Dictated by:  Ezequiel Smith MD    T: 11/28/2017 13:47 ddp             =======================================================================

## 2017-12-01 NOTE — ANESTHESIA PREPROCEDURE EVALUATION
Anesthesia Evaluation     . Pt has had prior anesthetic.     History of anesthetic complications   - PONV        ROS/MED HX    ENT/Pulmonary:      (-) asthma and sleep apnea   Neurologic: Comment: PRES diagnosis last month after seizure episode    (+)TIA date: 10-15 yr ago features: unilateral facial droop,     Cardiovascular: Comment: Echo 6/2015: LVEF 70%, RV nl  Stress 2015: negative    (+) Dyslipidemia, hypertension----. : . . . :. .       METS/Exercise Tolerance:  3 - Able to walk 1-2 blocks without stopping   Hematologic:         Musculoskeletal:         GI/Hepatic: Comment: Hx gastric bypass  SBO last month s/p ex lap, ESTEFANIA, mesenteric repair    Concern fir biliary obstruction        Renal/Genitourinary:         Endo:         Psychiatric:         Infectious Disease:         Malignancy:         Other:                     Physical Exam  Normal systems: dental    Airway   Mallampati: III  TM distance: <3 FB  Neck ROM: limited    Dental     Cardiovascular   Rhythm and rate: regular and normal  (+) weak pulses (unable to find R radial pulse)   (-) no murmur    Pulmonary    breath sounds clear to auscultation(-) no rhonchi                    Anesthesia Plan      History & Physical Review      ASA Status:  3 .    NPO Status:  > 8 hours    Plan for General and ETT with Intravenous induction. Maintenance will be Balanced.      Additional equipment: Videolaryngoscope GETA. PIVx1 (will replace with second in OR). Standard ASA monitors. IV opioids. PACU postop    Risks and benefits of anesthetic discussed with patient including sore throat, voice hoarseness, injury to vocal cords, throat, mouth, teeth, tongue, and lips from intubation; cardiac arrest, respiratory complications, MI, stroke, blood clots.    Presented opportunity to answer patient and family questions. Questions addressed.        Postoperative Care      Consents  Anesthetic plan, risks, benefits and alternatives discussed with:  Patient..                           .

## 2017-12-02 NOTE — PROGRESS NOTES
POST-OP CHECK    S  Complaining of epigastric and RUQ abdominal pain post-procedure    O  /68 (BP Location: Right arm)  Pulse 78  Temp 96.2  F (35.7  C) (Oral)  Resp 18  Ht 1.524 m (5')  Wt 74.8 kg (165 lb)  SpO2 96%  BMI 32.22 kg/m2    General: laying in bed, knees flexed to help alleviate abdominal pain  Abd: tenderness to palpation over RUQ and epigastric region  Skin: jaundiced    Resp: non labored breathing    A/P  Stephanie is a 78 yo F admitted for management of biliary obstruction 2/2 mass in head of pancreas seen on CT POD#0 from EUS FNA, gastrogastrostomy with stent placement, ERCP with biopsy of pancreatic mass done 12/1 by GI, doing well. See Op Note for further details regarding procedure    -CLD overnight  -avoid anticoagulants/antithrombotics for 48-72hours per GI  -f/u pathology results  -stent placed for biliary drainage but per GI, if persistent abdominal pain despite drainage, may be a candidate for celiac block  -pain control overnight    Stacey Browne MD  Family Medicine PGY2

## 2017-12-02 NOTE — OR NURSING
Report given to TIM Cazares RN.  Writer notified ROSITA Cazares that there are no IV pumps and no capnography machines in PACU at this time.  PACU monitor capnography reading 35 mmHg for ETCO2.

## 2017-12-02 NOTE — PLAN OF CARE
Problem: Patient Care Overview  Goal: Plan of Care/Patient Progress Review  A&O. VSS on 1L O2. Pain in abdomen managed with Dilaudid 0.5mg. Compazine given for nausea. L PIV- NS. Up w/ SBA. No BM. Voiding. UA and UC sample sent. Will continue to monitor and follow POC.

## 2017-12-02 NOTE — PLAN OF CARE
Problem: Patient Care Overview  Goal: Plan of Care/Patient Progress Review  Pt came back from PACU alert and oriented X4, BP slightly elevated, O2 sats in the mid 90% on RA. Lisinopril given. Pt reported mild pain in LLQ. Pt c/o feeling nausea, Zofran given. Tolerated clear liquid diet well. Pt c/o feeling exhausted, requested to have all her med given so she can go to bed early. Pt came back to the unit without Capno, appear stable but will monitor pt closely.

## 2017-12-02 NOTE — PROGRESS NOTES
[facility] - Inpatient daily progress note    Date of admission: 11/30/2017  Date of service: 12/2/2017.         Assessment and Plan:   Assessment:   Stephanie Haddad is a 77 year old  female with a significant past medical history of multiple abdominal surgeries (see below), h/o colon cancer, h/o SBO, HTN, GERD, and HLD, who presents from OSH for further evaluation and management of biliary obstruction 2/2 mass in head of pancreas seen on CT, POD#1.    Patient Active Problem List   Diagnosis     Carpal tunnel syndrome of left wrist     Brachial vein thrombosis (H)     Atherosclerosis     Asthma     Chest pain at rest     Chronic bilateral upper abdominal pain     Closed traumatic displaced fracture of metatarsal bone of right foot with routine healing     Malignant tumor of colon (H)     DDD (degenerative disc disease), lumbar     Depression     Diffuse esophageal spasm     Dilation of esophagus     Epicondylitis     Fibrocystic breast disease     Herniated nucleus pulposus, L5-S1, right     History of colon cancer     History of CVA (cerebrovascular accident)     HTN (hypertension)     Hx SBO     Hypercholesteremia     Hyperlipidemia     Insomnia     Jaundice     MARIBEL (obstructive sleep apnea)     Palpitations     PRES (posterior reversible encephalopathy syndrome)     S/P exploratory laparotomy     S/P gastric bypass     Seizure (H)     Vitamin D deficiency     Pancreatic mass      Plan:   ##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  ##biliary outlet obstruction: Elevated LFTS, AlkP, Bilirubin  ##jaundice  CT done 11/28 showed lesion within the head of the pancreas measuring 1.2 x 1.3 cm with intrahepatic biliary duct, CBD and pancreatic duct dilatation. Review of CT done 11/3/2017 reports unremarkable pancreas. Complete Abd US and MRCP also done at OSH.  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 is causing biliary obstruction leading to elevated bilirubin, LFTs, alk phos and jaundice. S/p EUS FNA, gastrogastrostomy  with Axios stent placement, ERCP POD#1   -GI pac/bili consulted with Bariatric Surgery assistance due to patient's anatomy, continue to appreciate recs.   - Will continue CLD today and ADAT  - Continue MIVF until patient is able to tolerate PO.   - Repeat labs this AM: AlkP >2330, ALT//310, Bili 8.8 (7.4 direct), lipase 31  - Will start abx if febrile, showing signs of sepsis, or worsening abdominal exam  - hold pharmacologic DVT ppx for 48-72 post procedure per GI recs.   - patient advised to walk at least 6 times a day.      ##Abnormal UA  UA performed at OSH on 11/28 positive for nitrite and bacteria. Did not receive any antibiotics  - Denies symptoms, although could be masked by her symptoms from pancreatic mass  - UCx 11/28/17 with >100k colonies pan-sensitive E. Coli  - Bactrim oral DS BID for 3 days, has received 2 doses this admission.      Chronic Medical Conditions:  ##PRES  ##h/o seizures  -continue keppra 1000mg BID  ##HTN: Continue PTA lisinopril 10mg tab: 2 tabs in AM, 1 in PM  ##H/O CVA: Hold PTA asprin  ##s/p Lula-en-y gastric bypass-continue PTAvitamin and mineral supplements  ##GERD-continue PTA omeprazole  ##Depression-continue PTA sertraline 100mg daily     Daily cares -   F:IVF@ 100 ml/hr until patient tolerating PO.   E:stable  N:NPO  Lines:PIV  Activity:-Up as tolerated  CODE:Full Code  Prophylaxis: mechanical, may start pharmacologic DVT ppx post-procedure  PCP communication:  - Tamar Salazar  - Contacted at admission: No  - Concerns or updates: none  Dispo: Expected discharge date pending work up of pancreatic mass             Interval History:   Is POD#1 from S/p EUS FNA, gastrogastrostomy with Axios stent placement, ERCP. Tolerated procedure without any complications. Awaiting pathology results. She states she is very fatigued this morning as she was woken up frequently overnight. Did not attempt to eat as she has no appetite, continues to have nausea and abdominal pain (improved  this morning). Overall feels her pain is better, but not resolved. Skin coloring looks improved this morning. Writer spoke to GI this morning and no acute changes today, can ADAT.               Review of Systems:   Review of Systems   Constitutional: Positive for appetite change and fatigue. Negative for chills, diaphoresis and fever.   HENT: Negative for trouble swallowing.    Eyes: Negative for visual disturbance.   Respiratory: Negative for cough, shortness of breath and wheezing.    Cardiovascular: Negative for chest pain and leg swelling.   Gastrointestinal: Positive for abdominal pain (improved) and nausea (improved). Negative for diarrhea and vomiting.   Endocrine: Negative for polydipsia.   Genitourinary: Negative for dysuria, flank pain and urgency.   Musculoskeletal: Negative for joint swelling and neck stiffness.   Skin: Negative for rash.   Neurological: Negative for dizziness and headaches.   Psychiatric/Behavioral: Negative for agitation and confusion.            Physical Exam (Resident / Clinician):   Vitals were reviewed  Patient Vitals for the past 8 hrs:   BP Temp Temp src Heart Rate Resp SpO2   12/02/17 0538 138/50 97.9  F (36.6  C) Oral 85 16 96 %       Physical Exam   Constitutional: She is oriented to person, place, and time. She appears well-nourished. No distress.   HENT:   Head: Normocephalic and atraumatic.   Mouth/Throat: No oropharyngeal exudate.   Eyes: EOM are normal. Right eye exhibits no discharge. Left eye exhibits no discharge. Scleral icterus is present.   Neck: Normal range of motion.   Cardiovascular: Normal rate and regular rhythm.    Murmur heard.  Pulmonary/Chest: Effort normal. No respiratory distress. She has no wheezes.   Abdominal: Soft. She exhibits no distension. There is tenderness (mild epigastric tenderness. ).   Multiple well-healed surgical scars   Neurological: She is alert and oriented to person, place, and time.   Skin: Skin is warm. No rash noted. She is not  diaphoretic.   Significantly jaundiced, some improvement since yesterday.    Psychiatric: She has a normal mood and affect. Her behavior is normal.       I/O last 3 completed shifts:  In: 3761.67 [I.V.:3761.67]  Out: 250 [Urine:250]        Data:   ROUTINE LABS (Last four results)  CMP    Recent Labs  Lab 12/01/17  0705      POTASSIUM 4.0   CHLORIDE 109   CO2 24   ANIONGAP 8   *   BUN 5*   CR 0.74   GFRESTIMATED 75   GFRESTBLACK >90   SERENA 8.6   PROTTOTAL 4.6*   ALBUMIN 1.6*   BILITOTAL 8.8*   ALKPHOS >2330*   *   *     CBC    Recent Labs  Lab 12/01/17  0705   WBC 3.9*   RBC 3.54*   HGB 10.7*   HCT 35.4      MCH 30.2   MCHC 30.2*   RDW 18.2*        INR    Recent Labs  Lab 12/01/17  0705   INR 1.09     CRPNo lab results found in last 7 days.      Blood culture:  Invalid input(s): BC   Urine culture:  No results for input(s): URC in the last 168 hours.  All cultures:    Recent Labs  Lab 12/02/17  0520   CULT PENDING           Medications:     Current Facility-Administered Medications   Medication     sulfamethoxazole-trimethoprim (BACTRIM DS/SEPTRA DS) 800-160 MG per tablet 1 tablet     May continue current IV fluid if patient has IV fluids infusing until discharge.     sodium chloride (PF) 0.9% PF flush 3 mL     naloxone (NARCAN) injection 0.1-0.4 mg     flumazenil (ROMAZICON) injection 0.2 mg     HYDROmorphone (PF) (DILAUDID) injection 0.3-0.5 mg     Calcium carb-Vitamin D 500 mg [place]-200 units (OSCAL with D;Oyster Shell Calcium) per tablet 1 tablet     fexofenadine (ALLEGRA) tablet 180 mg     folic acid-vit B6-vit B12 (FOLGARD) per tablet 1 tablet     lovastatin (MEVACOR) tablet 40 mg     montelukast (SINGULAIR) tablet 10 mg     omeprazole (priLOSEC) CR capsule 20 mg     senna-docusate (SENOKOT-S;PERICOLACE) 8.6-50 MG per tablet 1 tablet     sertraline (ZOLOFT) tablet 100 mg     naloxone (NARCAN) injection 0.1-0.4 mg     lidocaine 1 % 1 mL     lidocaine (LMX4) kit     sodium  chloride (PF) 0.9% PF flush 3 mL     sodium chloride (PF) 0.9% PF flush 3 mL     acetaminophen (TYLENOL) tablet 650 mg     senna-docusate (SENOKOT-S;PERICOLACE) 8.6-50 MG per tablet 1 tablet    Or     senna-docusate (SENOKOT-S;PERICOLACE) 8.6-50 MG per tablet 2 tablet     ondansetron (ZOFRAN-ODT) ODT tab 4 mg    Or     ondansetron (ZOFRAN) injection 4 mg     prochlorperazine (COMPAZINE) injection 5 mg    Or     prochlorperazine (COMPAZINE) tablet 5 mg    Or     prochlorperazine (COMPAZINE) Suppository 12.5 mg     multivitamin  with lutein (OCUVITE WITH LTEIN) per capsule 1 capsule     temazepam (RESTORIL) capsule 15 mg     levETIRAcetam (KEPPRA) tablet 1,000 mg     lisinopril (PRINIVIL/ZESTRIL) tablet 10 mg     lisinopril (PRINIVIL/ZESTRIL) tablet 20 mg     0.9% sodium chloride infusion       Caring Physician: Judith Fernandez MD  [facility] [place]  Pager Contact: see Physician sticky note

## 2017-12-02 NOTE — ANESTHESIA CARE TRANSFER NOTE
Patient: Stephanie Haddad    Procedure(s):  Endoscopic Ultrasound, with Fine needle aspiration, gastrogastrostomy with stent placement, upper endoscopy, endoscopic retrograde cholangiopancreatogram with sphincterotomy and biliary stent placement - Wound Class: II-Clean Contaminated   - Wound Class: II-Clean Contaminated    Diagnosis: Pancreatic Cancer   Diagnosis Additional Information: No value filed.    Anesthesia Type:   General     Note:  Airway :Face Mask  Patient transferred to:PACU  Comments: VSS. Breathing spontaneously at a regular rate with adequate tidal volumes and maintaining O2 sats on 6L facemask. Denies nausea or pain. No apparent complications from anesthesia.     Adam Altamirano DO  CA-1  Handoff Report: Identifed the Patient, Identified the Reponsible Provider, Reviewed the pertinent medical history, Discussed the surgical course, Reviewed Intra-OP anesthesia mangement and issues during anesthesia, Set expectations for post-procedure period and Allowed opportunity for questions and acknowledgement of understanding      Vitals: (Last set prior to Anesthesia Care Transfer)    CRNA VITALS  12/1/2017 1731 - 12/1/2017 1818      12/1/2017             Pulse: 96    Ht Rate: 96    SpO2: 99 %    Resp Rate (observed): 8                Electronically Signed By: Adam Altamirano DO  December 1, 2017  6:18 PM

## 2017-12-02 NOTE — PROGRESS NOTES
Brief GI note        Patient seen and reviewed her chart. S/P EUS FNA, gastrogastrostomy with Axios stent placement and ERCP. Found to have CBD stricture with upstream biliary dilation s/p sphincterotomy and stent placement. Obstructed PD with upstream dilation, pancreatic head mass FNA. Patient pain is better with significant improvement in T. Bili.       Plan  --Ok to advance diet   --Continue ongoing supportive cares  --Might require  once discharge to follow up on her given she lives far away  --Awaiting pathology results   --We will continue to follow       Patient discussed with Dr. Vargas, GI staff       Aden Parker  GI Fellow

## 2017-12-02 NOTE — BRIEF OP NOTE
[facility], [place]  Gastroenterology Brief Operative Note    Pre-operative diagnosis: Pancreatic mass, biliary obstruction   Post-operative diagnosis Same   Procedure: EUS FNA, gastrogastrostomy with Axios stent placement, ERCP   Surgeon: Jeremy Quintanilla MD; Tres Vargas MD   Assistants(s): Shorty Buchanan MD   Anesthesia: General endotracheal anesthesia   Estimated blood loss: Minimal    Total IV fluids: (See anesthesia record)   Blood transfusion: No transfusion was given during surgery   Total urine output: (See anesthesia record)   Drains: None   Specimens: Pancreatic head mass FNA   Implants: Axios across gastrogastrostomy and 10 mm x 4 cm uncovered metal Wallflex stent in CBD   Findings: Evidence of gastric bypass. ~2-3 cm mass in the head of the pancreas with loss of effacement with the SMV. Obstructed PD with upstream dilation. FNA performed with a 25 gauge needle. T2N0Mx by EUS however limited views seen from the gastric pouch and jejunum because of gastric bypass.  Gastrogastrostomy created by identifying remnant stomach from gastric pouch, puncturing with 19 gauge needle in distending remnant with water, and using Hot Axios device to deploy 15 mm stent across gastrogastrostomy. Stent post-dilated to 15 mm.  Duodenoscope then advanced to major papilla. Cholangiogram showed short distal CBD stricture with upstream biliary dilation. Sphincterotomy performed. 10 mm x 4 cm uncovered metal stent placed across stricture.   Complications: None   Condition: Stable   Comments:      Recommendations:         See dictated procedure report for full details (found in chart review under 'Procedures')    - [facility]  - Keep on clear liquids today  - Avoid anticoagulants/antithrombotics for 48-72 hours  - Await pathology results  - Panc-Bili team to continue following  - If persistent abdominal pain despite biliary drainage, could then be a candidate for a celiac  neurolysis     Shorty Buchanan MD  [phone]

## 2017-12-02 NOTE — ANESTHESIA POSTPROCEDURE EVALUATION
Patient: Stephanie Haddad    Procedure(s):  Endoscopic Ultrasound, with Fine needle aspiration, gastrogastrostomy with stent placement, upper endoscopy, endoscopic retrograde cholangiopancreatogram with sphincterotomy and biliary stent placement - Wound Class: II-Clean Contaminated   - Wound Class: II-Clean Contaminated    Diagnosis:Pancreatic Cancer   Diagnosis Additional Information: No value filed.    Anesthesia Type:  General    Note:  Anesthesia Post Evaluation    Patient location during evaluation: PACU  Patient participation: Able to fully participate in evaluation  Level of consciousness: awake  Pain management: adequate  Airway patency: patent  Cardiovascular status: acceptable  Respiratory status: acceptable  Hydration status: acceptable  PONV: none     Anesthetic complications: None          Last vitals:  Vitals:    12/01/17 1300 12/01/17 1315 12/01/17 1400   BP: 150/58     Pulse:      Resp: 16 16    Temp: 36.8  C (98.3  F)     SpO2: 95% 95% (P) 99%         Electronically Signed By: Juana Wills MD  December 1, 2017  6:38 PM

## 2017-12-03 NOTE — PROGRESS NOTES
[facility] - Inpatient daily progress note    Date of admission: 11/30/2017  Date of service: 12/3/2017.         Assessment and Plan:   Assessment:   Stephanie Haddad is a 77 year old  female with a significant past medical history of multiple abdominal surgeries (see below), h/o colon cancer, h/o SBO, HTN, GERD, and HLD, who presents from OSH for further evaluation and management of biliary obstruction 2/2 mass in head of pancreas seen on CT, s/p EUS FNA, gastrogastrostomy with Axios stent placement, ERCP (12/1/17), awaiting pathology and tolerance of oral intake.    Patient Active Problem List   Diagnosis     Carpal tunnel syndrome of left wrist     Brachial vein thrombosis (H)     Atherosclerosis     Asthma     Chest pain at rest     Chronic bilateral upper abdominal pain     Closed traumatic displaced fracture of metatarsal bone of right foot with routine healing     Malignant tumor of colon (H)     DDD (degenerative disc disease), lumbar     Depression     Diffuse esophageal spasm     Dilation of esophagus     Epicondylitis     Fibrocystic breast disease     Herniated nucleus pulposus, L5-S1, right     History of colon cancer     History of CVA (cerebrovascular accident)     HTN (hypertension)     Hx SBO     Hypercholesteremia     Hyperlipidemia     Insomnia     Jaundice     MARIBEL (obstructive sleep apnea)     Palpitations     PRES (posterior reversible encephalopathy syndrome)     S/P exploratory laparotomy     S/P gastric bypass     Seizure (H)     Vitamin D deficiency     Pancreatic mass      Plan:   ##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  ##biliary outlet obstruction: Elevated LFTS, AlkP, Bilirubin  ##jaundice  CT done 11/28 showed lesion within the head of the pancreas measuring 1.2 x 1.3 cm with intrahepatic biliary duct, CBD and pancreatic duct dilatation and concomitant hyperbilirubinemia, elevated AlkP, transaminase elevation. Review of CT done 11/3/2017 reports unremarkable pancreas.    - S/p EUS FNA, gastrogastrostomy with Axios stent placement, ERCP (12/1/17)  -GI pac/bili consulted with Bariatric Surgery assistance due to patient's anatomy, continue to appreciate recs.   - No appetite, no significant oral intake overnight, encouraged to try CLD this AM, will ADAT  - Continue MIVF until patient is able to tolerate adequate PO.   - Repeat CBC, CMP: Bilirubin, AlkP, AST/ALT all down-trending, no leukocytosis  - Will start abx if febrile, showing signs of sepsis, or worsening abdominal exam  - holding pharmacologic DVT ppx until 72 hrs post procedure (earliest would be evening of 12/4/17)  - patient advised to walk at least 6 times a day.  - Ensure shake supplements for poor nutritional intake  - Nutritional consult, appreciate recs     ##Abnormal UA  UA performed at OSH on 11/28 positive for nitrite and bacteria. Did not receive any antibiotics  - Denies symptoms, although could be masked by her symptoms from pancreatic mass  - UCx 11/28/17 with >100k colonies pan-sensitive E. Coli  - Bactrim oral DS BID for 3 days, to end on 12/3/17    ## History of MARIBEL  Reports was diagnosed with MARIBEL in past, had sleep study, does not use CPAP because could not tolerate the masks.  Saturations down to 88% on room air while sleeping, placed on 2L NC overnight, patient reports no dyspnea.  Is +5L for net I/O (though multiple unmeasured urine outputs so likely less) and is post-surgery, could have some mild fluid retention, no CKD or CHF history, will likely auto-diurese.  Could have mild pleural effusion 2/2 pancreatic mass, liver congestion, surgery.  Was able to saturate >92% on room air while awake.  - Consider CXR if develops dyspnea, increasing oxygen requirements  - Encourage IS q2h while awake  - 6 minute walk test in case needs home oxygen    ## Anemia: macrocytic  Hemoglobin 12.3 on 10/30/17, was 10.7 on admission, currently 9.9 s/p procedure,  or higher.  Could be malnutrition given Lula en Y,  recent SBO, pancreatic mass, and other abdominal disease, now with with minimal acute blood loss 2/2 procedure.  - will monitor, no over symptoms at this time, no signs of continuing blood loss  - Will check B12 and folate, zinc, copper, selenium, iron, vit d, given R&Y and poor oral intake.  Is already on some supplements but may need adjustment     Chronic Medical Conditions:  ##PRES  ##h/o seizures  -continue keppra 1000mg BID  ##HTN: Continue PTA lisinopril 10mg tab: 2 tabs in AM, 1 in PM  ##H/O CVA: Hold PTA asprin  ##s/p Lula-en-y gastric bypass-continue PTAvitamin and mineral supplements  ##GERD-continue PTA omeprazole  ##Depression-continue PTA sertraline 100mg daily     Daily cares -   F:IVF@ 100 ml/hr until patient tolerating PO.   E:stable  N: ADAT  Lines:PIV  Activity:-Up as tolerated  CODE:Full Code  Prophylaxis: mechanical, may start pharmacologic DVT ppx >72 hrs post-op  PCP communication:  - Tamar Salazar  - Contacted at admission: No  - Concerns or updates: none  Dispo: Expected discharge date pending work up of pancreatic mass             Interval History:   Overnight had no appetite, reports having no dinner or breakfast yet this morning, nauseated, but wants to try eating this morning.  No vomiting, had one bowel movement earlier in day.  Continuing abdominal pain, controlled with oxy and tylenol.  Saturations down to 88% while sleeping, placed on 2L NC, patient reports no dyspnea, ambulated in halls for goal of 6 times yesterday.             Review of Systems:   Review of Systems   Constitutional: Positive for appetite change and fatigue. Negative for chills, diaphoresis and fever.   HENT: Negative for trouble swallowing.    Eyes: Negative for visual disturbance.   Respiratory: Negative for cough, shortness of breath and wheezing.    Cardiovascular: Negative for chest pain and leg swelling.   Gastrointestinal: Positive for abdominal pain (improved) and nausea (improved). Negative for diarrhea and  vomiting.   Endocrine: Negative for polydipsia.   Genitourinary: Negative for dysuria, flank pain and urgency.   Musculoskeletal: Negative for joint swelling and neck stiffness.   Skin: Positive for color change (jaundice improving). Negative for rash.   Neurological: Negative for dizziness and headaches.   Psychiatric/Behavioral: Negative for agitation and confusion.            Physical Exam (Resident / Clinician):   Vitals were reviewed  Patient Vitals for the past 8 hrs:   BP Temp Temp src Heart Rate Resp SpO2   12/03/17 0643 162/56 98  F (36.7  C) Oral 73 16 98 %       Physical Exam   Constitutional: She is oriented to person, place, and time. She appears well-nourished. No distress.   HENT:   Head: Normocephalic and atraumatic.   Eyes: EOM are normal. Right eye exhibits no discharge. Left eye exhibits no discharge. Scleral icterus: mostly resolved.   Neck: Normal range of motion.   Cardiovascular: Normal rate and regular rhythm.    Murmur heard.  Pulmonary/Chest: Effort normal. No respiratory distress. She has no wheezes.   Abdominal: Soft. She exhibits no distension. There is tenderness (mild, worst in epigastric).   Multiple well-healed surgical scars   Neurological: She is alert and oriented to person, place, and time.   Skin: Skin is warm. No rash noted. She is not diaphoretic.   Jaundice largely resolved    Psychiatric: She has a normal mood and affect. Her behavior is normal.       I/O last 3 completed shifts:  In: 3246.67 [P.O.:840; I.V.:2406.67]  Out: 650 [Urine:500; Other:150]        Data:   ROUTINE LABS (Last four results)  CMP    Recent Labs  Lab 12/03/17  0750 12/02/17  0749 12/01/17  0705    139 142   POTASSIUM 3.7 3.7 4.0   CHLORIDE 109 107 109   CO2 23 24 24   ANIONGAP 8 8 8   * 128* 110*   BUN 6* 6* 5*   CR 0.64 0.69 0.74   GFRESTIMATED >90 83 75   GFRESTBLACK >90 >90 >90   SERENA 7.9* 8.2* 8.6   PROTTOTAL 4.5* 5.1* 4.6*   ALBUMIN 1.5* 1.7* 1.6*   BILITOTAL 3.1* 3.6* 8.8*   ALKPHOS  1678* >2330* >2330*   AST 69* 143* 310*   ALT 99* 147* 178*     CBC    Recent Labs  Lab 12/03/17  0750 12/02/17  0749 12/01/17  0705   WBC 5.0 5.1 3.9*   RBC 3.23* 3.64* 3.54*   HGB 9.9* 11.3* 10.7*   HCT 32.9* 37.2 35.4   * 102* 100   MCH 30.7 31.0 30.2   MCHC 30.1* 30.4* 30.2*   RDW 17.1* 17.6* 18.2*    259 271     INR    Recent Labs  Lab 12/01/17  0705   INR 1.09     CRPNo lab results found in last 7 days.      Blood culture:  Invalid input(s): BC   Urine culture:  No results for input(s): URC in the last 168 hours.  All cultures:    Recent Labs  Lab 12/02/17  0520   CULT Culture in progress           Medications:     Current Facility-Administered Medications   Medication     oxyCODONE IR (ROXICODONE) tablet 5 mg     sulfamethoxazole-trimethoprim (BACTRIM DS/SEPTRA DS) 800-160 MG per tablet 1 tablet     May continue current IV fluid if patient has IV fluids infusing until discharge.     sodium chloride (PF) 0.9% PF flush 3 mL     Calcium carb-Vitamin D 500 mg [place]-200 units (OSCAL with D;Oyster Shell Calcium) per tablet 1 tablet     fexofenadine (ALLEGRA) tablet 180 mg     folic acid-vit B6-vit B12 (FOLGARD) per tablet 1 tablet     lovastatin (MEVACOR) tablet 40 mg     montelukast (SINGULAIR) tablet 10 mg     omeprazole (priLOSEC) CR capsule 20 mg     senna-docusate (SENOKOT-S;PERICOLACE) 8.6-50 MG per tablet 1 tablet     sertraline (ZOLOFT) tablet 100 mg     naloxone (NARCAN) injection 0.1-0.4 mg     lidocaine 1 % 1 mL     lidocaine (LMX4) kit     sodium chloride (PF) 0.9% PF flush 3 mL     sodium chloride (PF) 0.9% PF flush 3 mL     acetaminophen (TYLENOL) tablet 650 mg     senna-docusate (SENOKOT-S;PERICOLACE) 8.6-50 MG per tablet 1 tablet    Or     senna-docusate (SENOKOT-S;PERICOLACE) 8.6-50 MG per tablet 2 tablet     ondansetron (ZOFRAN-ODT) ODT tab 4 mg    Or     ondansetron (ZOFRAN) injection 4 mg     prochlorperazine (COMPAZINE) injection 5 mg    Or     prochlorperazine (COMPAZINE) tablet 5  mg    Or     prochlorperazine (COMPAZINE) Suppository 12.5 mg     multivitamin  with lutein (OCUVITE WITH LTEIN) per capsule 1 capsule     temazepam (RESTORIL) capsule 15 mg     levETIRAcetam (KEPPRA) tablet 1,000 mg     lisinopril (PRINIVIL/ZESTRIL) tablet 10 mg     lisinopril (PRINIVIL/ZESTRIL) tablet 20 mg     0.9% sodium chloride infusion       Caring Physician: Von Brooke MD  [facility] [place]  Pager Contact: see Physician sticky note

## 2017-12-03 NOTE — PLAN OF CARE
Problem: Patient Care Overview  Goal: Plan of Care/Patient Progress Review  A&O. PT on 2L O2 overnight, saturations WNL. Oxy 5mg and Tylenol given for pain in abdomen.  Compazine given for nausea. Voiding. No BM this shift.  SBA to commode.  Will continue to monitor and follow POC.

## 2017-12-03 NOTE — PLAN OF CARE
Problem: Patient Care Overview  Goal: Plan of Care/Patient Progress Review  Outcome: No Change  VSS, on room air, denies shortness of breath, continues to c/o abdominal pain and nausea, treated with IV Dilaudid Zofran, now switched to Oxycodone, able to tolerate clears, poor appetite, had two loose stools today, voiding adequately, encouraged ambulation. Continue with the plan of care.

## 2017-12-03 NOTE — PLAN OF CARE
Problem: Patient Care Overview  Goal: Plan of Care/Patient Progress Review  Outcome: No Change  Temp: 97.4  F (36.3  C) Temp src: Oral BP: 161/63 Pulse: 94 Heart Rate: 77 Resp: 18 SpO2: 96 % O2 Device: Nasal cannula Oxygen Delivery: 2 LPM     Oxygen saturations dropping below 90% on room air; patient placed on supplemental oxygen at 2 LPM via NC. A&O. Up with SBA and walker; ambulated in hallways 6 times today. Clear liquid diet; ate 0% of supper 2/2 no appetite. Intermittent nausea, no emesis; PRN Zofran 4 mg IV given. NS infusing at 100 mL/hr to PIV. Voiding wnl's. No stools. Complaints of abdominal pain; PRN oxycodone 5 mg PO given. Expected discharge date pending work up of pancreatic mass.

## 2017-12-04 NOTE — PLAN OF CARE
Problem: Pain, Acute (Adult)  Goal: Acceptable Pain Control/Comfort Level  Patient will demonstrate the desired outcomes by discharge/transition of care.   Outcome: No Change  VSS, on room air during the day, O2 sats in the low 90's, ambulated in the halls X 5 this shift, using IS with encouragment, c/o abdominal pain and intermittent nausea, received Oxycodone and Zofran with relief, diet advanced to regular today, pt has no appetite, voiding small amounts, no BM today.

## 2017-12-04 NOTE — PROGRESS NOTES
"  GASTROENTEROLOGY PROGRESS NOTE    Date of Admission: 11/30/2017  Reason for Admission: pancreatic mass      Assessment:  77 year old female with a history of chronic abdominal pain s/p multiple surgeries including RNYGB, colon resection (colon cancer), open appy, CCY and most recently an ex lap and ESTEFANIA for SBO on 11/10. She presented to OSH on 11/28 with jaundice and worsening abdominal pain, CT scan A/P showing new pancreatic head mass. MRCP with biliary and pancreatic duct dilation. S/p Axios gastrogastrostomy placement 12/1 for access to sample pancreatic mass (prelim positive for malignancy) and for biliary stenting ([id number] uncovered metal stent placed across stricture). LFT trending down, awaiting final path report.      Recommendations:  Await pathology results  Likely will need medical oncology consult this admission (patient wishes [place] care in [place])  OK to ADAT  Trend LFT  Analgesia/Antiemetics per primary team  Discussed with primary team      The patient was discussed and plan agreed upon with GI staff, Dr Vanegas.      Ginny Goddard PA-C   Advanced Endoscopy/Pancreaticobiliary [facility]  [facility]  Pager *[phone]  _______________________________________________________________      Subjective: Patient seen and examined at 1045. Patient reports that she is feeling okay. Discussed procedure results. Reports that she continues to have pain on L ribcage, feels like \"muscle spasms\". RUQ abd pain resolved since procedure. Tolerating general diet so far. No fevers.    ROS:   4 pt ROS negative unless noted in subjective.     Objective:  Blood pressure 152/56, pulse 81, temperature 98.2  F (36.8  C), temperature source Oral, resp. rate 18, height 1.524 m (5'), weight 78 kg (172 lb), SpO2 96 %.  Gen: NAD  HEENT: + scleral icterus  Abd: Soft, nontender, ND. No HSM  Ext: WWP  Skin: mild jaundice      Date 12/04/17 0700 - 12/05/17 0659   Shift 3526-7916 5137-3615 7980-0331 24 " Hour Total   I  N  T  A  K  E   P.O. 300   300    Shift Total  (mL/kg) 300  (3.85)   300  (3.85)   O  U  T  P  U  T   Shift Total  (mL/kg)       Weight (kg) 78.02 78.02 78.02 78.02           PROCEDURES:  EUS/ERCP 12/1 - Dr. Vargas and Dr. Quintanilla  Evidence of gastric bypass. ~2-3 cm mass in the head of the pancreas with loss of effacement with the SMV. Obstructed PD with upstream dilation. FNA performed with a 25 gauge needle. T2N0Mx by EUS however limited views seen from the gastric pouch and jejunum because of gastric bypass.  Gastrogastrostomy created by identifying remnant stomach from gastric pouch, puncturing with 19 gauge needle in distending remnant with water, and using Hot Axios device to deploy 15 mm stent across gastrogastrostomy. Stent post-dilated to 15 mm.  Duodenoscope then advanced to major papilla. Cholangiogram showed short distal CBD stricture with upstream biliary dilation. Sphincterotomy performed. 10 mm x 4 cm uncovered metal stent placed across stricture.    LABS:  BMP  Recent Labs  Lab 12/04/17  0821 12/03/17  0750 12/02/17  0749 12/01/17  0705    139 139 142   POTASSIUM 3.3* 3.7 3.7 4.0   CHLORIDE 110* 109 107 109   SERENA 7.8* 7.9* 8.2* 8.6   CO2 22 23 24 24   BUN 4* 6* 6* 5*   CR 0.72 0.64 0.69 0.74   * 120* 128* 110*     CBC  Recent Labs  Lab 12/04/17  0821 12/03/17  0750 12/02/17  0749 12/01/17  0705   WBC 4.8 5.0 5.1 3.9*   RBC 3.34* 3.23* 3.64* 3.54*   HGB 10.3* 9.9* 11.3* 10.7*   HCT 33.8* 32.9* 37.2 35.4   * 102* 102* 100   MCH 30.8 30.7 31.0 30.2   MCHC 30.5* 30.1* 30.4* 30.2*   RDW 17.0* 17.1* 17.6* 18.2*    246 259 271     INR  Recent Labs  Lab 12/01/17  0705   INR 1.09     LFTs  Recent Labs  Lab 12/04/17  0821 12/03/17  0750 12/02/17  0749 12/01/17  0705   ALKPHOS 1632* 1678* >2330* >2330*   AST 58* 69* 143* 310*   ALT 86* 99* 147* 178*   BILITOTAL 2.4* 3.1* 3.6* 8.8*   PROTTOTAL 4.9* 4.5* 5.1* 4.6*   ALBUMIN 1.7* 1.5* 1.7* 1.6*      PANC  Recent  Labs  Lab 12/01/17  0705   LIPASE 31*         IMAGING:  MRCP 11/28/2017    CLINICAL DATA:   Abdominal pain and jaundice, biliary dilatation on recent CT scan.    COMPARISON:   None. CT scan of 11/28/2017 is available.    TECHNIQUE:   Axial and coronal T2-weighted images are obtained. High-resolution heavily   T2-weighted images are obtained through the biliary system and 3-D MIPS   performed. No contrast given.    FINDINGS:   The biliary system is dilated, with a tortuous common bile duct. Common   bile duct measures 17 mm in size. There is an abrupt termination of the   common bile duct at the pancreatic head at the site of the mass seen on CT   scan. The mass is not clearly seen on MRI. However, this study is not   designed for pancreatic evaluation. The pancreatic duct is also dilated,   upstream from the pancreatic mass. No filling defects are seen within the   biliary system or pancreatic duct. No definite choledocholithiasis.    The patient is status-post cholecystectomy. No definite focal hepatic   lesions are noted. The spleen is normal in size. No evidence for adrenal   mass. Extrarenal pelves are seen with peripelvic cysts. No definite   obstructive uropathy. Abdominal aorta is normal in size. No   retroperitoneal adenopathy. Loops of bowel appear within normal limits.   Postoperative scar is seen in the anterior abdominal wall. Degenerative   disc disease is seen throughout the lower thoracic and lumbar spine.  1. Intrahepatic and extrahepatic biliary ductal dilatation, consistent   with the appearance of the CT scan. This is probably caused by pancreatic   mass, not assessed on this study. The mass can be seen on the recent CT   scan. Endoscopic ultrasound and biopsy recommended for definitive   evaluation.  2. Pancreatic duct is also dilated, upstream from the pancreatic mass.  3. Status-post cholecystectomy.  4. No definite evidence for intrahepatic or retroperitoneal metastatic   disease. Additional  nonemergent findings as above.    Results discussed with Dr. Duke on 11/28/2017 at 1710.    D: 11/28/2017 17:59   Dictated by:  Tony Huerta MD    T: 11/28/2017 18:54  j      CT SCAN ABDOMEN AND PELVIS WITH CONTRAST    CLINICAL DATA:   Abdominal pain. Recent small bowel obstruction status post laparoscopy 2-3   weeks ago. Now with new jaundice and diffuse abdominal pain.    COMPARISON:   11/03/2017    TECHNIQUE:   While obtaining CT images, dose reduction techniques were utilized   including:    Automated exposure control    Adjustment of mA and/or kV according to patient size, and/or    Use of iterative reconstruction technique    FINDINGS:   Lung bases are clear other than dependent atelectasis. There is no pleural   or pericardial fluid.     ABDOMEN/PELVIS: There is diffuse decreased attenuation of the liver   consistent with fatty infiltration. There has been interval development of   markedly dilated common bile duct measuring up to 1.4 cm in diameter.   There is a mildly dilated pancreatic duct which is increased in size since   the previous exam. There is a slightly hypoattenuating lesion within the   head of the pancreas which is more pronounced than on the previous exam   and measures approximately 13 x 13.2 cm in maximal transverse dimension on   S2:51. This may reflect focal pancreatitis; however, underlying lesion   within the pancreas is not excluded. This is not appreciated on the   previous exam however. There is intrahepatic biliary ductal dilatation   also noted.    Bowel loops are normal in caliber. There is no free air. There is a mild   amount of free fluid within the cul-de-sac. Perivesical and perirectal fat   planes are preserved. Uterus is absent.  1. Interval development of dilated common bile duct and pancreatic duct   with possible mass or inflammatory process within the head of the   pancreas. ERCP or MRCP are recommended for further evaluation.   2. Fatty infiltration of  the liver.     Results called to Dr. Duke in the emergency room at 13:16 hours.    D: 11/28/2017 13:15   Dictated by:  Ezequiel Smith MD    T: 11/28/2017 13:47 ddp

## 2017-12-04 NOTE — PLAN OF CARE
Problem: Patient Care Overview  Goal: Plan of Care/Patient Progress Review  K is being replaced. Pt just called nurse into the room c/o abd pain and nausea. Zofran and oxycodone po given. Pt ambulated 3x today so far. Alert and oriented, able to make needs known.

## 2017-12-04 NOTE — PROGRESS NOTES
[facility] - Inpatient daily progress note    Date of admission: 11/30/2017  Date of service: 12/4/2017.         Assessment and Plan:   Assessment:   Stephanie Haddad is a 77 year old  female with a significant past medical history of multiple abdominal surgeries (see below), h/o colon cancer, h/o SBO, HTN, GERD, and HLD, who presents from OSH for further evaluation and management of biliary obstruction 2/2 mass in head of pancreas seen on CT, s/p EUS FNA, gastrogastrostomy with Axios stent placement, ERCP (12/1/17), awaiting pathology and tolerance of oral intake.    Patient Active Problem List   Diagnosis     Carpal tunnel syndrome of left wrist     Brachial vein thrombosis (H)     Atherosclerosis     Asthma     Chest pain at rest     Chronic bilateral upper abdominal pain     Closed traumatic displaced fracture of metatarsal bone of right foot with routine healing     Malignant tumor of colon (H)     DDD (degenerative disc disease), lumbar     Depression     Diffuse esophageal spasm     Dilation of esophagus     Epicondylitis     Fibrocystic breast disease     Herniated nucleus pulposus, L5-S1, right     History of colon cancer     History of CVA (cerebrovascular accident)     HTN (hypertension)     Hx SBO     Hypercholesteremia     Hyperlipidemia     Insomnia     Jaundice     MARIBEL (obstructive sleep apnea)     Palpitations     PRES (posterior reversible encephalopathy syndrome)     S/P exploratory laparotomy     S/P gastric bypass     Seizure (H)     Vitamin D deficiency     Pancreatic mass      Plan:   ##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  ##biliary outlet obstruction: Elevated LFTS, AlkP, Bilirubin  ##jaundice  CT done 11/28 showed lesion within the head of the pancreas measuring 1.2 x 1.3 cm with intrahepatic biliary duct, CBD and pancreatic duct dilatation and concomitant hyperbilirubinemia, elevated AlkP, transaminase elevation. Review of CT done 11/3/2017 reports unremarkable pancreas.    - S/p EUS FNA, gastrogastrostomy with Axios stent placement, ERCP (12/1/17)  -GI pac/bili consulted with Bariatric Surgery assistance due to patient's anatomy, continue to appreciate recs.   - Improved appetite and oral intake, tolerating regular diet, stopped IVF  - Repeat CBC, CMP: Bilirubin, AlkP, AST/ALT all down-trending, no leukocytosis  - held DVT ppx post-procedure for at least 48 hours as per GI recs, will restart tonight if not walking sufficiently  - patient advised to walk at least 6 times a day.  - Ensure shake supplements for poor nutritional intake  - Nutritional consult, appreciate recs     ##Abnormal UA  UA performed at OSH on 11/28 positive for nitrite and bacteria. Did not receive any antibiotics  - Denies symptoms, although could be masked by her symptoms from pancreatic mass  - UCx 11/28/17 with >100k colonies pan-sensitive E. Coli  - Bactrim oral DS BID for 3 days,  completed on 12/3/17    ## History of MARIBEL  Reports was diagnosed with MARIBEL in past, had sleep study, does not use CPAP because could not tolerate the masks.  Saturations down to 88% on room air while sleeping, placed on 2L NC overnight, patient reports no dyspnea.  Is +5L for net I/O (though multiple unmeasured urine outputs so likely less) and is post-surgery, could have some mild fluid retention, no CKD or CHF history, will likely auto-diurese.  Could have mild pleural effusion 2/2 pancreatic mass, liver congestion, surgery.  Was able to saturate >92% on room air while awake.  - Consider CXR if develops dyspnea, increasing oxygen requirements  - Encourage IS q2h while awake  - 6 minute walk test in case needs home oxygen    ## Anemia: macrocytic  Hemoglobin 12.3 on 10/30/17, was 10.7 on admission, currently 9.9 s/p procedure,  or higher.  Could be malnutrition given Lula en Y, recent SBO, pancreatic mass, and other abdominal disease, now with with minimal acute blood loss 2/2 procedure.  - will monitor, no over symptoms at  this time, no signs of continuing blood loss  - B12, folate, iron wnl or higher (is on supplements), rest of vitamin, nutritional levels pending     Chronic Medical Conditions:  ##PRES  ##h/o seizures  -continue keppra 1000mg BID  ##HTN: Continue PTA lisinopril 10mg tab: 2 tabs in AM, 1 in PM  ##H/O CVA: Hold PTA asprin  ##s/p Lula-en-y gastric bypass-continue PTA vitamin and mineral supplements  ##GERD-continue PTA omeprazole  ##Depression-continue PTA sertraline 100mg daily     Daily cares -   F: PO.   E:stable  N: Regular diet  Lines:PIV  Activity:-Up as tolerated  CODE:Full Code  Prophylaxis: mechanical, may start pharmacologic DVT ppx >72 hrs post-op  PCP communication:  - Tamar Salazar  - Contacted at admission: No  - Concerns or updates: none  Dispo: Expected discharge date tomorrow pending pathology result.             Interval History:   Overnight had improved appetite, reduced nausea, tolerated regular diet.  Abdominal pain resolved, feeling generally better, walked 5 times yesterday she reports.  Wants to go home but wants to have results first and plan as she lives 3 hours away.  Her fiancee, Von, will plan to drive and pick her up tomorrow if discharging.            Review of Systems:   Review of Systems   Constitutional: Negative for chills, diaphoresis and fever.   HENT: Negative for trouble swallowing.    Eyes: Negative for visual disturbance.   Respiratory: Negative for cough, shortness of breath and wheezing.    Cardiovascular: Negative for chest pain and leg swelling.   Gastrointestinal: Negative for abdominal pain, diarrhea, nausea and vomiting.   Endocrine: Negative for polydipsia.   Genitourinary: Negative for dysuria, flank pain and urgency.   Musculoskeletal: Negative for joint swelling and neck stiffness.   Skin: Positive for color change (jaundice improving). Negative for rash.   Neurological: Negative for dizziness and headaches.   Psychiatric/Behavioral: Negative for agitation and  confusion.            Physical Exam (Resident / Clinician):   Vitals were reviewed  Patient Vitals for the past 8 hrs:   BP Temp Temp src Heart Rate Resp SpO2   12/04/17 1342 152/56 98.2  F (36.8  C) Oral 88 18 96 %       Physical Exam   Constitutional: She is oriented to person, place, and time. She appears well-nourished. No distress.   HENT:   Head: Normocephalic and atraumatic.   Eyes: EOM are normal. Right eye exhibits no discharge. Left eye exhibits no discharge. Scleral icterus: mostly resolved.   Neck: Normal range of motion.   Cardiovascular: Normal rate and regular rhythm.    Murmur heard.  Pulmonary/Chest: Effort normal. No respiratory distress. She has no wheezes.   Abdominal: Soft. She exhibits no distension. There is no tenderness.   Multiple well-healed surgical scars   Neurological: She is alert and oriented to person, place, and time.   Skin: Skin is warm. No rash noted. She is not diaphoretic.   Jaundice largely resolved    Psychiatric: She has a normal mood and affect. Her behavior is normal.       I/O last 3 completed shifts:  In: 1821.67 [P.O.:1020; I.V.:801.67]  Out: 500 [Urine:500]        Data:   ROUTINE LABS (Last four results)  CMP    Recent Labs  Lab 12/04/17  1537 12/04/17  0821 12/03/17  0750 12/02/17  0749 12/01/17  0705   NA  --  141 139 139 142   POTASSIUM 4.1 3.3* 3.7 3.7 4.0   CHLORIDE  --  110* 109 107 109   CO2  --  22 23 24 24   ANIONGAP  --  9 8 8 8   GLC  --  109* 120* 128* 110*   BUN  --  4* 6* 6* 5*   CR  --  0.72 0.64 0.69 0.74   GFRESTIMATED  --  79 >90 83 75   GFRESTBLACK  --  >90 >90 >90 >90   SERENA  --  7.8* 7.9* 8.2* 8.6   PROTTOTAL  --  4.9* 4.5* 5.1* 4.6*   ALBUMIN  --  1.7* 1.5* 1.7* 1.6*   BILITOTAL  --  2.4* 3.1* 3.6* 8.8*   ALKPHOS  --  1632* 1678* >2330* >2330*   AST  --  58* 69* 143* 310*   ALT  --  86* 99* 147* 178*     CBC    Recent Labs  Lab 12/04/17  0821 12/03/17  0750 12/02/17  0749 12/01/17  0705   WBC 4.8 5.0 5.1 3.9*   RBC 3.34* 3.23* 3.64* 3.54*   HGB  10.3* 9.9* 11.3* 10.7*   HCT 33.8* 32.9* 37.2 35.4   * 102* 102* 100   MCH 30.8 30.7 31.0 30.2   MCHC 30.5* 30.1* 30.4* 30.2*   RDW 17.0* 17.1* 17.6* 18.2*    246 259 271     INR    Recent Labs  Lab 12/01/17  0705   INR 1.09     CRPNo lab results found in last 7 days.      Blood culture:  Invalid input(s): BC   Urine culture:  No results for input(s): URC in the last 168 hours.  All cultures:    Recent Labs  Lab 12/02/17  0520   CULT <10,000 colonies/mLCoagulase negative StaphylococcusSusceptibility testing in progress*  <1000 colonies/mLurogenital floraSusceptibility testing not routinely done           Medications:     Current Facility-Administered Medications   Medication     senna-docusate (SENOKOT-S;PERICOLACE) 8.6-50 MG per tablet 1 tablet     potassium chloride SA (K-DUR/KLOR-CON M) CR tablet 20-40 mEq     potassium chloride (KLOR-CON) Packet 20-40 mEq     potassium chloride 10 mEq in 100 mL sterile water intermittent infusion (premix)     potassium chloride 10 mEq in 100 mL intermittent infusion with 10 mg lidocaine     magnesium sulfate 4 g in 100 mL sterile water (premade)     oxyCODONE IR (ROXICODONE) tablet 5 mg     sodium chloride (PF) 0.9% PF flush 3 mL     Calcium carb-Vitamin D 500 mg [place]-200 units (OSCAL with D;Oyster Shell Calcium) per tablet 1 tablet     fexofenadine (ALLEGRA) tablet 180 mg     folic acid-vit B6-vit B12 (FOLGARD) per tablet 1 tablet     lovastatin (MEVACOR) tablet 40 mg     montelukast (SINGULAIR) tablet 10 mg     omeprazole (priLOSEC) CR capsule 20 mg     sertraline (ZOLOFT) tablet 100 mg     naloxone (NARCAN) injection 0.1-0.4 mg     lidocaine 1 % 1 mL     lidocaine (LMX4) kit     sodium chloride (PF) 0.9% PF flush 3 mL     sodium chloride (PF) 0.9% PF flush 3 mL     acetaminophen (TYLENOL) tablet 650 mg     senna-docusate (SENOKOT-S;PERICOLACE) 8.6-50 MG per tablet 1 tablet    Or     senna-docusate (SENOKOT-S;PERICOLACE) 8.6-50 MG per tablet 2 tablet      ondansetron (ZOFRAN-ODT) ODT tab 4 mg    Or     ondansetron (ZOFRAN) injection 4 mg     prochlorperazine (COMPAZINE) injection 5 mg    Or     prochlorperazine (COMPAZINE) tablet 5 mg    Or     prochlorperazine (COMPAZINE) Suppository 12.5 mg     multivitamin  with lutein (OCUVITE WITH LTEIN) per capsule 1 capsule     temazepam (RESTORIL) capsule 15 mg     levETIRAcetam (KEPPRA) tablet 1,000 mg     lisinopril (PRINIVIL/ZESTRIL) tablet 10 mg     lisinopril (PRINIVIL/ZESTRIL) tablet 20 mg       Caring Physician: Von Brooke MD  [facility] [place]  Pager Contact: see Physician sticky note

## 2017-12-04 NOTE — PLAN OF CARE
Problem: Patient Care Overview  Goal: Plan of Care/Patient Progress Review  Pt was up to the BR this am with SBA and walker. Alert and oriented X 4, c/o LUQ pain. K 3.3, medicine team was paged to put in replacement protocol.

## 2017-12-04 NOTE — PLAN OF CARE
Problem: Patient Care Overview  Goal: Plan of Care/Patient Progress Review  K recheck will be at 1600. Pt's BP values have been slightly elevated. Medicine team was notified. Awaiting response.

## 2017-12-04 NOTE — PLAN OF CARE
Problem: Patient Care Overview  Goal: Plan of Care/Patient Progress Review  A&O. On 2L O2 overnight. Pain in abdomen managed with oral oxycodone 5mg and Tylenol.  Compazine and Zofran given for nausea with pain meds. No BM this shift.  SBA to commode. Voiding. Reg diet. Pending biopsy of pancreatic mass. Will continue to monitor and follow POC.

## 2017-12-04 NOTE — PROGRESS NOTES
CLINICAL NUTRITION SERVICES - BRIEF NOTE  *See RD note on 12/1 for nutrition assessment details    Diet: Regular + Ensure Plus shakes TID b/w meals.  Was NPO/clear liquids 1130-12/3, then diet advanced to regular. Poor PO intakes noted so far.    INTERVENTIONS  Implementation  Calorie counts x 3 days to quantify PO intake adequacy    Monitoring/Evaluation  Progress toward goals will be monitored and evaluated per protocol.     Terri Mauricio RD, LD  Pager: [phone]

## 2017-12-05 NOTE — PROVIDER NOTIFICATION
Patient has been assessed for Home Oxygen needs. Oxygen readings:    *Pulse oximetry (SpO2) = 97% on room air at rest while awake.    *SpO2 = 96% on room air during activity/with exercise.

## 2017-12-05 NOTE — PROGRESS NOTES
Care Coordinator- Discharge Planning     Admission Date/Time:  11/30/2017  Attending MD:  Cheng Velasco MD     Data  Date of initial CC assessment:  12/5/2017  Chart reviewed, discussed with interdisciplinary team.   Patient was admitted for:   1. Pancreatic mass    2. PRES (posterior reversible encephalopathy syndrome)         Assessment  Concerns with insurance coverage for discharge needs: None; Medicare and BCBS [place]  Current Living Situation: Patient lives with significant other; [place]  Support System: Supportive and Involved  Services Involved: none  Transportation: patient does not have transportation benefits, sig other will need to   Barriers to Discharge: none - as of today medically ready     Coordination of Care  D: Chart reviewed and plan of care discussed with MD team. Patient admitted for pancreatic mass. Plan for patient to discharge today. I/A: No discharge needs identified.     RNCC was notified during rounds that patients significant other does not want to drive on the snowy roads (from [place]) to  the patient today. RNCC explained that patient does not have insurance transportation benefits and providing a ride home (at hospital expense) is out of the question as it would be an exorbitant cost for a taxi ride to [place]. Staying an extra night in the hospital is also unacceptable. RNCC offered suggestion to Team that patient could discharge and stay over night at a local hotel or her significant other will simply have to come and get her.     P: Care coordinator will remain available for additional discharge needs that may arise.     Referrals: Provided patient/family with options for none needed.      Plan  Anticipated Discharge Date:  12/5/2017  Anticipated Discharge Plan:  Home    CTS Handoff completed:  YES    Teresa CRUZN RN PHN  Patient Care Management Coordinator  [facility]  Phone: [phone] / Pager: [phone]

## 2017-12-05 NOTE — PLAN OF CARE
Problem: Patient Care Overview  Goal: Plan of Care/Patient Progress Review  A &O.VSS on RA. Pain in LUQ abdomen managed with Tylenol and Oxy 5mg. Zofran given for nausea with pain meds. 1 small BM. Voiding. SBA. R PIV-SL. Pending pathology results.

## 2017-12-05 NOTE — DISCHARGE INSTRUCTIONS
Follow up with your primary care provider on 12/8/17 @ 10 AM at [facility] for hospital follow up, repeat CMP and CBC, and to discuss final pathology results.  [facility] [id number]  Please bring your insurance card, ID, and these discharge papers with you to this appointment.

## 2017-12-05 NOTE — PLAN OF CARE
Problem: Patient Care Overview  Goal: Plan of Care/Patient Progress Review  5A PT - Cancel/defer, per chart review and discussion with interdisciplinary team, pt does not require skilled inpatient physical therapy at this time. Pt had an order for 6MWT, but actually only needed home O2 needs assessment, which was already completed and no need for home O2.  Pt was asking if she could qualify for a short TCU stay in [place] to get closer to home.  Spoke with CC and pt, discovered pt's primary reason for wanting TCU was to cover transportation cost closer to home, but CC indicated with her insurance, she would need to private pay transportation if family does not transport.  Pt indicates a family member will drive her own car down to get her, rather than a truck, thus no longer concerned about safe transfer in/out of a truck.  Pt and RN indicate she has been walking independently in hospital room and halls.  Pt denies concern for falls, stating tripped once while gardening and the other fall was due to a seizure as far as falls in last year.  Pt declines PT eval while in hospital and verbalizes understanding she can ask her PCP for PT eval/treat orders if she is afraid of falls, or her mobility is not progressing as expected. Please re-consult as needed if patient experiences a change in functional mobility or goals requiring skilled inpatient physical therapy.

## 2017-12-05 NOTE — PLAN OF CARE
Problem: Patient Care Overview  Goal: Plan of Care/Patient Progress Review  Outcome: No Change  D/c orders received from MD. D/c orders reviewed with patient and significant other. IV d/c'ed cannula intact. D/c home with family.

## 2017-12-05 NOTE — PROGRESS NOTES
12/5/2017 Gastroenterology Brief Note    Chart reviewed    77 year old female with a history of chronic abdominal pain s/p multiple surgeries including RNYGB, colon resection (colon cancer), open appy, CCY and most recently an ex lap and ESTEFANIA for SBO on 11/10. She presented to OSH on 11/28 with jaundice and worsening abdominal pain, CT scan A/P showing new pancreatic head mass. MRCP with biliary and pancreatic duct dilation. S/p Axios gastrogastrostomy placement 12/1 for access to sample pancreatic mass (prelim positive for malignancy) and for biliary stenting ([id number] uncovered metal stent placed across stricture).     Biopsies returned today, positive for malignancy, morphologically consistent with adenocarcinoma. Final results communicated to patient by primary team.    Patient to establish with oncology in hometown (primary team has arranged appointment already)  OK for discharge from GI standpoint  No GI follow up needed at this point    Above in collaboration with Dr. Romina Goddard PAAPOLLO  [facility]  Pager *[phone]

## 2017-12-05 NOTE — DISCHARGE SUMMARY
[facility]  Discharge Summary    Stephanie Haddad MRN# [id number]   Age: 77 year old YOB: 1940     Date of Admission:  11/30/2017  Date of Discharge:  12/5/2017  Admitting Physician:  Ann Del Angel MD  Discharge Physician:  Von Stratton MD  Contact: [phone]  Discharging Service:  [facility]     Primary Provider:   [address] [address]  None          Discharge Disposition:   Discharged to home           Condition on Discharge:   Discharge condition: Stable   Code status on discharge: Full Code          Admission Diagnoses:   Pancreatic mass  Pancreatic mass  Pancreatic Cancer           Principle Discharge Problem:   ##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: adenocarcinoma  ##biliary outlet obstruction: Elevated LFTS, AlkP, Bilirubin  ##jaundice         Additional Discharge Problems:     Patient Active Problem List    Diagnosis Date Noted     Pancreatic mass 12/01/2017     Priority: Medium     Jaundice 11/28/2017     Priority: Medium     Seizure (H) 11/20/2017     Priority: Medium     PRES (posterior reversible encephalopathy syndrome) 11/19/2017     Priority: Medium     S/P exploratory laparotomy 11/14/2017     Priority: Medium     Chronic bilateral upper abdominal pain 10/16/2017     Priority: Medium     S/P gastric bypass 06/26/2017     Priority: Medium     Overview:   Added automatically from request for surgery [id number]       Carpal tunnel syndrome of left wrist 04/05/2017     Priority: Medium     Closed traumatic displaced fracture of metatarsal bone of right foot with routine healing 09/06/2016     Priority: Medium     Hx SBO 10/31/2015     Priority: Medium     Dilation of esophagus 08/11/2015     Priority: Medium     Brachial vein thrombosis (H) 06/10/2015     Priority: Medium     Atherosclerosis 06/10/2015     Priority: Medium     Asthma 06/10/2015     Priority: Medium     Depression  06/10/2015     Priority: Medium     Diffuse esophageal spasm 06/10/2015     Priority: Medium     Epicondylitis 06/10/2015     Priority: Medium     Fibrocystic breast disease 06/10/2015     Priority: Medium     Herniated nucleus pulposus, L5-S1, right 06/10/2015     Priority: Medium     Insomnia 06/10/2015     Priority: Medium     MARIBEL (obstructive sleep apnea) 06/10/2015     Priority: Medium     Vitamin D deficiency 06/10/2015     Priority: Medium     DDD (degenerative disc disease), lumbar 08/26/2013     Priority: Medium     History of CVA (cerebrovascular accident) 04/26/2013     Priority: Medium     Chest pain at rest 01/28/2013     Priority: Medium     Malignant tumor of colon (H) 01/17/2012     Priority: Medium     Overview:   IMO Update 10/11       HTN (hypertension) 01/17/2012     Priority: Medium     Overview:   IMO Update 10/11       Hypercholesteremia 01/17/2012     Priority: Medium     Overview:   IMO Update 10/11       History of colon cancer 03/18/2010     Priority: Medium     Hyperlipidemia 03/18/2010     Priority: Medium     Palpitations 03/18/2010     Priority: Medium            Medications Prior to Admission:     No current facility-administered medications on file prior to encounter.   Current Outpatient Prescriptions on File Prior to Encounter:  omeprazole (PRILOSEC) 20 MG CR capsule Take 20 mg by mouth   ibuprofen (ADVIL,MOTRIN) 600 MG tablet Take 1 tablet by mouth every 6 hours as needed (inflammatory pain).   senna-docusate (SENOKOT-S;PERICOLACE) 8.6-50 MG per tablet Take 1 tablet by mouth daily.   aspirin 325 MG tablet Take 325 mg by mouth daily.   CALCIUM-VITAMIN D PO Take 2 daily   lisinopril (PRINIVIL,ZESTRIL) 10 MG tablet Take 10 mg by mouth 2 times daily    lovastatin (MEVACOR) 40 MG tablet Take 40 mg by mouth At Bedtime.   montelukast (SINGULAIR) 10 MG tablet Take 10 mg by mouth At Bedtime.   Folic Acid-Vit B6-Vit B12 (FOLBEE PO) Take 1 daily   sertraline (ZOLOFT) 100 MG tablet Take 100  mg by mouth daily.   temazepam (RESTORIL) 15 MG capsule Take 15 mg by mouth nightly as needed.   multivitamin (OCUVITE) TABS Take 1 tablet by mouth daily    IRON PO Time release take ONE daily   oxycodone-acetaminophen (PERCOCET) 5-325 MG per tablet Take 1-2 tablets by mouth every 6 hours as needed for pain.   Glucagon rDNA, Diagnostic, (GLUCAGON DIAGNOSTIC IJ) Inject 1 mg as directed. 0.25 - 0.5 mg IV during MRI research scan, may repeat once for a max of 1mg IV.   fexofenadine (ALLEGRA) 180 MG tablet Take 180 mg by mouth daily.   NIACIN CR PO Take  by mouth.   nitroGLYCERIN (NITROQUICK) 0.4 MG SL tablet Place 0.4 mg under the tongue every 5 minutes as needed.   UNABLE TO FIND Ascorbplex 1000 mg 2 times daily            Discharge Medications:        Review of your medicines      START taking       Dose / Directions    levETIRAcetam 1000 MG Tabs   Used for:  PRES (posterior reversible encephalopathy syndrome)        Dose:  1000 mg   Take 1,000 mg by mouth 2 times daily   Quantity:  60 tablet   Refills:  0       ondansetron 4 MG ODT tab   Commonly known as:  ZOFRAN-ODT        Dose:  4 mg   Take 1 tablet (4 mg) by mouth every 6 hours as needed for nausea or vomiting   Quantity:  90 tablet   Refills:  0         CONTINUE these medicines which have NOT CHANGED       Dose / Directions    aspirin 325 MG tablet        Dose:  325 mg   Take 325 mg by mouth daily.   Refills:  0       CALCIUM-VITAMIN D PO        Take 2 daily   Refills:  0       fexofenadine 180 MG tablet   Commonly known as:  ALLEGRA        Dose:  180 mg   Take 180 mg by mouth daily.   Refills:  0       FOLBEE PO        Take 1 daily   Refills:  0       GLUCAGON DIAGNOSTIC IJ   Used for:  Ovarian cyst        Dose:  1 mg   Inject 1 mg as directed. 0.25 - 0.5 mg IV during MRI research scan, may repeat once for a max of 1mg IV.   Refills:  0       ibuprofen 600 MG tablet   Commonly known as:  ADVIL/MOTRIN   Used for:  Ovarian cyst        Dose:  600 mg   Take 1  tablet by mouth every 6 hours as needed (inflammatory pain).   Quantity:  40 tablet   Refills:  0       IRON PO        Time release take ONE daily   Refills:  0       lisinopril 10 MG tablet   Commonly known as:  PRINIVIL/ZESTRIL        Dose:  10 mg   Take 10 mg by mouth 2 times daily   Refills:  0       lovastatin 40 MG tablet   Commonly known as:  MEVACOR        Dose:  40 mg   Take 40 mg by mouth At Bedtime.   Refills:  0       multivitamin Tabs tablet        Dose:  1 tablet   Take 1 tablet by mouth daily   Refills:  0       NIACIN CR PO        Take  by mouth.   Refills:  0       NITROQUICK 0.4 MG sublingual tablet   Generic drug:  nitroGLYcerin        Dose:  0.4 mg   Place 0.4 mg under the tongue every 5 minutes as needed.   Refills:  0       omeprazole 20 MG CR capsule   Commonly known as:  priLOSEC        Dose:  20 mg   Take 20 mg by mouth   Refills:  0       oxyCODONE-acetaminophen 5-325 MG per tablet   Commonly known as:  PERCOCET   Used for:  Ovarian cyst        Dose:  1-2 tablet   Take 1-2 tablets by mouth every 6 hours as needed for pain.   Quantity:  30 tablet   Refills:  0       senna-docusate 8.6-50 MG per tablet   Commonly known as:  SENOKOT-S;PERICOLACE   Used for:  Ovarian cyst        Dose:  1 tablet   Take 1 tablet by mouth daily.   Quantity:  30 tablet   Refills:  0       SINGULAIR 10 MG tablet   Generic drug:  montelukast        Dose:  10 mg   Take 10 mg by mouth At Bedtime.   Refills:  0       temazepam 15 MG capsule   Commonly known as:  RESTORIL        Dose:  15 mg   Take 15 mg by mouth nightly as needed.   Refills:  0       UNABLE TO FIND        Ascorbplex 1000 mg 2 times daily   Refills:  0       ZOLOFT 100 MG tablet   Generic drug:  sertraline        Dose:  100 mg   Take 100 mg by mouth daily.   Refills:  0            Where to get your medicines      These medications were sent to [facility] - [place], [place] - [address]  [address] [address]      Phone:  [phone]      levETIRAcetam 1000 MG Tabs     ondansetron 4 MG ODT tab                  Discharge Instructions and Follow-Up:   Discharge diet: Regular   Discharge activity: Activity as tolerated   Discharge follow-up: Follow up with primary care provider, Dr. Tamar Salazar, on 12/6/17 at 10:20 AM for hospital follow up, repeat CMP and CBC, and to discuss final pathology results.  Follow up with oncology clinic, appointment scheduled on 12/14/17 at 9 AM at [address] [place] [address], [address] Floor, [place] [address].  Phone: [phone]   Lines and drains: None    Wound care: None          Brief Admission History and Evaluation:   From admission H&P:    Stephanie Haddad is a 77 year old  female with a significant past medical history of multiple abdominal surgeries (see below), h/o colon cancer, h/o SBO, HTN, GERD, and HLD, who presents from OSH for further evaluation and management of biliary obstruction 2/2 mass in head of pancreas seen on CT.      Stephanie reports a h/o chronic abdominal pain, nausea, vomiting, loss of appetite, weight loss, and fatigue since May. She states she has lost at least 30lbs since May. Has had multiple EGDs performed (9/2015, 7/2017,11/2017) and an extensive abdominal surgery history. She was recently hospitalized 11/7-11/14 for acute on chronic abdominal pain with elevated CRP, was seen by gen surg due to concern for SBO and underwent ex lap with repair of a mesenteric defect and lysis of adhesion on 11/10. On 11/19 she then developed acute vision changes with loss of vision and was transferred urgently to [facility] where she was diagnosed with PRES. At that time she was also noted to have seizure activity. She was discharged on 11/23 with increased lisinopril dose and was started on keppra for seizure activity.      Admitted to [facility] from 11/28-11/30 after being seen by her PCP for evaluation of increasing abdominal pain and jaundice. When evaluated in  ED, labs were remarkable for WBC of 3.8, LA 2.6, K 5.3, Bili 8.5,  and , Alk phos greatly elevated at 2228. CT abd showed mass at head of the pancreas with dilation of the CBD and pancreatic duct. While hospitalized, and Abd US and MRCP were performed confirming mass. Case was discussed with GI Dr. Vargas at [facility] and patient was transferred for EUS with ERCP and biopsy of pancreatic mass.      Abdominal Surgical Hx:  Lula-en-y Gastric Bypass 2004  NICHOLE, MAGDALENA 5/20/2011  Appendectomy  Cholecystectomy 1978  Ex lap with repair bile leak 10//2015  Colectomy w/o colostomy 2001  Ex lap with lysis of adhesion 11/10/2017  EGD: 9/2015, 7/2017, 11/2017          Hospital Course/Discharge Plan by Problem:   ##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: adenocarcinoma  ##biliary outlet obstruction: Elevated LFTS, AlkP, Bilirubin  ##jaundice  CT done 11/28 showed lesion within the head of the pancreas measuring 1.2 x 1.3 cm with intrahepatic biliary duct, CBD and pancreatic duct dilatation and concomitant hyperbilirubinemia, elevated AlkP, transaminase elevation. Review of CT done 11/3/2017 reports previously unremarkable pancreas.   - S/p EUS FNA, gastrogastrostomy with Axios stent placement, ERCP (12/1/17)  -GI pac/bili consulted with Bariatric Surgery assistance due to patient's anatomy  - Improved appetite and oral intake, tolerating regular diet, stopped IVF  - Repeat CBC, CMP: Bilirubin, AlkP, AST/ALT all down-trending, no leukocytosis  - held DVT ppx post-procedure for 72 hours, patient walking 6 times a day, then was discharged on no anti-coagulation.  - Awaiting final pathology results from FNA, preliminary verbal report given to GI team and relayed to patient was concerning for malignancy, no official report at this time.  - Arranged follow up with primary care and oncology appointment    UPDATE: FNA pathology resulted 2:38 PM prior to discharge  CYTOLOGIC INTERPRETATION:     Pancreas, Head Mass, Endoscopic Ultrasound-Guided  Fine Needle   Aspiration:   Positive for malignancy.   Morphologically consistent with adenocarcinoma   Specimen Adequacy: Satisfactory for evaluation.         ##Abnormal UA  UA performed at OSH on 11/28 positive for nitrite and bacteria. Did not receive any antibiotics  - Denies symptoms, although could be masked by her symptoms from pancreatic mass  - UCx 11/28/17 with >100k colonies pan-sensitive E. Coli  - Bactrim oral DS BID for 3 days,  completed on 12/3/17     ## History of MARIBEL  Reports was diagnosed with MARIBEL in past, had sleep study, does not use CPAP because could not tolerate the masks.  Saturations down to 88% on room air while sleeping, placed on 2L NC overnight, patient reports no dyspnea.  Had potentially mild fluid retention s/p surgery, auto-diuresed, had walk test before discharge with 96% saturation while walking on room air.  - No home oxygen on discharge  - Recommend reevaluation for CPAP, possibly nasal only mask, given MARIBEL history    ## Anemia: macrocytic  Hemoglobin 12.3 on 10/30/17, was 10.7 on admission, currently 10.4 on discharge, .  Could be malnutrition given Lula en Y, recent SBO, pancreatic mass, and other abdominal disease, now with with minimal acute blood loss 2/2 procedure.  - no overt symptoms at this time, no signs of continuing blood loss  - B12, folate, Vit D, iron wnl or higher (is on supplements)  - Elevated ferritin (276) and low transferrin (97) could indicate anemia of chronic disease picture, iron was normal at 63, , Saturation index 51  - rest of vitamin and nutritional levels pending including methylmalonic acid, Vit A, copper, zinc, selenium     Chronic Medical Conditions:  ##PRES  ##h/o seizures  -continue keppra 1000mg BID  ##HTN: Continue PTA lisinopril 10mg tab: 2 tabs in AM, 1 in PM  ##H/O CVA: Hold PTA asprin  ##s/p Lula-en-y gastric bypass-continue PTA vitamin and mineral supplements  ##GERD-continue PTA omeprazole  ##Depression-continue PTA  sertraline 100mg daily           Final Day of Progress before Discharge:         Interval History:  General:  feels better and appears to be improving   Vitals: vitals signs have been stable, no fever and no signficant change in respiratory or heart rates   Intake/Output: stable intake and output   Nutrition: regular diet   Mental status: mental status unchanged, alert and oriented and responding appropriately   Respiratory: Non-labored breathing on room air   Cardiovascular no chest pain, no palpitations and blood pressure stable   Renal: no change in renal function or urinary output and stable renal fuction   Neurologic: no focal deficits reported, no changes reported since previous exam and no changes in the cranial nerves   Medications: no changes in medications since last visit   Interventions: No interventions performed since the last visit   Consults: No consultations were requested since the last visit             Physical Exam:  Vitals were reviewed  Patient Vitals for the past 8 hrs:   BP Temp Temp src Heart Rate Resp SpO2 Weight   12/05/17 0920 - - - - - - 76.2 kg (167 lb 14.4 oz)   12/05/17 0606 148/46 98.1  F (36.7  C) Oral 82 16 94 % -   Constitutional: She is oriented to person, place, and time. She appears well-nourished. No distress.   HENT:   Head: Normocephalic and atraumatic.   Eyes: EOM are normal. Right eye exhibits no discharge. Left eye exhibits no discharge. Scleral icterus: resolved.   Neck: Normal range of motion.   Cardiovascular: Normal rate and regular rhythm.    Murmur heard.  Pulmonary/Chest: Effort normal. No respiratory distress. She has no wheezes.   Abdominal: Soft. She exhibits no distension. There is no tenderness.   Multiple well-healed surgical scars   Neurological: She is alert and oriented to person, place, and time.   Skin: Skin is warm. No rash noted. She is not diaphoretic.   Jaundice almost completely resolved    Psychiatric: She has a normal mood and affect. Her  behavior is normal.          Data:  Lab Results   Component Value Date    WBC 4.6 12/05/2017    WBC 4.8 12/04/2017    WBC 5.0 12/03/2017    HGB 10.4 (L) 12/05/2017    HGB 10.3 (L) 12/04/2017    HGB 9.9 (L) 12/03/2017    HCT 34.0 (L) 12/05/2017    HCT 33.8 (L) 12/04/2017    HCT 32.9 (L) 12/03/2017     12/05/2017     12/04/2017     12/03/2017     12/05/2017     12/04/2017     12/03/2017    POTASSIUM 4.2 12/05/2017    POTASSIUM 4.1 12/04/2017    POTASSIUM 3.3 (L) 12/04/2017    CHLORIDE 107 12/05/2017    CHLORIDE 110 (H) 12/04/2017    CHLORIDE 109 12/03/2017    CO2 25 12/05/2017    CO2 22 12/04/2017    CO2 23 12/03/2017    BUN 5 (L) 12/05/2017    BUN 4 (L) 12/04/2017    BUN 6 (L) 12/03/2017    CR 0.71 12/05/2017    CR 0.72 12/04/2017    CR 0.64 12/03/2017    GLC 99 12/05/2017     (H) 12/04/2017     (H) 12/03/2017    AST 48 (H) 12/05/2017    AST 58 (H) 12/04/2017    AST 69 (H) 12/03/2017    ALT 73 (H) 12/05/2017    ALT 86 (H) 12/04/2017    ALT 99 (H) 12/03/2017    ALKPHOS 1371 (H) 12/05/2017    ALKPHOS 1632 (H) 12/04/2017    ALKPHOS 1678 (H) 12/03/2017    BILITOTAL 2.7 (H) 12/05/2017    BILITOTAL 2.4 (H) 12/04/2017    BILITOTAL 3.1 (H) 12/03/2017    INR 1.09 12/01/2017     No imaging performed at [facility], had prior imaging done which was reviewed.         Pending Results:   Pending nutritional deficiency panel: Vit A, selenium, zinc, copper, methylmalonic acid      It was a pleasure to participate in the care of Stephanie Haddad.  If you have questions about her care, please do not hesitate to contact the [facility] team via the hospital tor[facility] on which we are stationed.      [facility]  [facility], [address] - [phone]

## 2017-12-05 NOTE — PROGRESS NOTES
Pt A&O, VSS on RA. Walk test done this evening for home O2, encouraging 6 walks/day. Pt continues to have LUQ pain, alternating prn tylenol and oxycodone. Potassium replaced this am, recheck therapeutic. Ensure TID discontinued per pt request, stating they make her very nauseated. Poor appetite. Good UO, LBM 12/2. Brad counts at 0000. Awaiting pathology results for d/c.

## 2017-12-06 NOTE — PROGRESS NOTES
Patient was discharged from [facility] on 12/5              [facility]  Discharge Summary     Stephanie Haddad MRN# [id number]   Age: 77 year old YOB: 1940      Date of Admission:                                      11/30/2017  Date of Discharge:                                      12/5/2017  Admitting Physician:                                   Ann Del Angel MD  Discharge Physician:                                  Von Stratton MD  Contact: [phone]  Discharging Service:                                   [facility]

## 2017-12-06 NOTE — PROGRESS NOTES
Final results from EUS-guided biopsy of pancreatic mass returned consistent with adenocarcinoma.    Confirmed with inpt panc bili consult service that pt has been informed of this result and scheduled for oncology consultation.    LONNIE Quintanilla MD  Associate Professor of Medicine  Division of Gastroenterology, Hepatology and Nutrition  [facility]

## 2019-02-08 NOTE — PLAN OF CARE
Discharge Planner   Discharge Plans in progress: Pt ready for discharge back to [facility]. Spoke with Adriana in admissions [phone], ok for pt to return today. Spoke w/ pt's son Jose, reviewed out of pocket cost for [facility] transport, $75 for base rate and $5 per mile to the destination. He expressed understanding and is agreeable to this. HE WC arranged for 1800 today. Facility and staff updated. Need discharge orders for SNF, will have bedside RN page provider for this. Will send orders once complete.     Son also expresses interest in having pt placed at a facility closer to their home if pt is going to be there more long term, he will discuss this further w/ SW at [facility]. Made Adriana aware of this as well.   Barriers to discharge plan: None.   Follow up plan: Not needed.        Entered by: Izabel Weaver 02/08/2019 10:18 AM       CM will continue to follow patient for any additional discharge needs.     Izabel Weaver RN BSN CTS   [phone]  Care Transitions Team  [facility]       Problem: Patient Care Overview  Goal: Plan of Care/Patient Progress Review  Outcome: No Change  Pt here for nausea, abdominal pain related to pancreatic mass. A&Ox4, VSS on RA. Up with assist x1. C/o abdominal pain - managed with PRN IV dilaudid q3. Pt reports nausea with pain medications - pt premedicated with PRN Zofran q6hrs & Compazine q6hrs. Left PIV infusing NS at 100 mL/hr. NPO except ice chips & medications. Voiding without difficulty, UA still needing to be collected, BM overnight, none on this shift.     Pt taken to OR for EGD/EUS at 1315. Continue to monitor & follow POC.

## (undated) DEVICE — SOL WATER IRRIG 1000ML BOTTLE 2F7114

## (undated) DEVICE — ESU GROUND PAD ADULT W/CORD E7507

## (undated) DEVICE — TAPE CLOTH 3" CARDINAL 3TRCL03

## (undated) DEVICE — PACK ENDOSCOPY GI CUSTOM UMMC

## (undated) DEVICE — ENDO PROBE COVER ULTRASOUND BALLOON LATEX  MAJ-249

## (undated) DEVICE — GUIDEWIRE NOVAGOLD .018X260CM STR TIP M00552000

## (undated) DEVICE — ENDO CONNECTOR ENDOGATOR AUX WATER JET FOR OLYMPUS SCOPE

## (undated) DEVICE — ENDO CAP AND TUBING STERILE FOR ENDOGATOR  100130

## (undated) DEVICE — ENDO NDL BALL TIP ULTRASOUND 25GA ECHO-25

## (undated) DEVICE — INFLATION DEVICE BIG 60 ENDO-AN6012

## (undated) DEVICE — CATH BALLOON ELATION ESOPH/PYL/COL 15-16.5-18MMX240CM EPCB15

## (undated) DEVICE — ENDO TUBING CO2 SMARTCAP STERILE DISP 100145CO2EXT

## (undated) DEVICE — SUCTION MANIFOLD DORNOCH ULTRA CART UL-CL500

## (undated) DEVICE — ENDO BITE BLOCK ADULT OMNI-BLOC

## (undated) DEVICE — BIOPSY VALVE BIOSHIELD 00711135

## (undated) DEVICE — KIT ENDO FIRST STEP DISINFECTANT 200ML W/POUCH EP-4

## (undated) DEVICE — ENDO FUSION OMNI-TOME 21 FS-OMNI-21 G48675

## (undated) DEVICE — PAD CHUX UNDERPAD 23X24" 7136

## (undated) RX ORDER — LEVOFLOXACIN 5 MG/ML
INJECTION, SOLUTION INTRAVENOUS
Status: DISPENSED
Start: 2017-01-01

## (undated) RX ORDER — FENTANYL CITRATE 50 UG/ML
INJECTION, SOLUTION INTRAMUSCULAR; INTRAVENOUS
Status: DISPENSED
Start: 2017-01-01

## (undated) RX ORDER — ONDANSETRON 2 MG/ML
INJECTION INTRAMUSCULAR; INTRAVENOUS
Status: DISPENSED
Start: 2017-01-01

## (undated) RX ORDER — EPHEDRINE SULFATE 50 MG/ML
INJECTION, SOLUTION INTRAMUSCULAR; INTRAVENOUS; SUBCUTANEOUS
Status: DISPENSED
Start: 2017-01-01

## (undated) RX ORDER — LIDOCAINE HYDROCHLORIDE 20 MG/ML
INJECTION, SOLUTION EPIDURAL; INFILTRATION; INTRACAUDAL; PERINEURAL
Status: DISPENSED
Start: 2017-01-01

## (undated) RX ORDER — GLYCOPYRROLATE 0.2 MG/ML
INJECTION, SOLUTION INTRAMUSCULAR; INTRAVENOUS
Status: DISPENSED
Start: 2017-01-01

## (undated) RX ORDER — ROCURONIUM BROMIDE 50 MG/5 ML
SYRINGE (ML) INTRAVENOUS
Status: DISPENSED
Start: 2017-01-01

## (undated) RX ORDER — PROPOFOL 10 MG/ML
INJECTION, EMULSION INTRAVENOUS
Status: DISPENSED
Start: 2017-01-01

## (undated) RX ORDER — PHENYLEPHRINE HCL IN 0.9% NACL 1 MG/10 ML
SYRINGE (ML) INTRAVENOUS
Status: DISPENSED
Start: 2017-01-01